# Patient Record
Sex: MALE | Race: WHITE | NOT HISPANIC OR LATINO | Employment: UNEMPLOYED | URBAN - NONMETROPOLITAN AREA
[De-identification: names, ages, dates, MRNs, and addresses within clinical notes are randomized per-mention and may not be internally consistent; named-entity substitution may affect disease eponyms.]

---

## 2021-11-23 ENCOUNTER — HOSPITAL ENCOUNTER (EMERGENCY)
Facility: HOSPITAL | Age: 41
Discharge: HOME | End: 2021-11-23
Attending: EMERGENCY MEDICINE | Admitting: EMERGENCY MEDICINE

## 2021-11-23 VITALS
TEMPERATURE: 99.1 F | OXYGEN SATURATION: 97 % | DIASTOLIC BLOOD PRESSURE: 84 MMHG | HEIGHT: 66 IN | WEIGHT: 205.6 LBS | HEART RATE: 99 BPM | BODY MASS INDEX: 33.04 KG/M2 | SYSTOLIC BLOOD PRESSURE: 126 MMHG | RESPIRATION RATE: 18 BRPM

## 2021-11-23 DIAGNOSIS — R20.2 PARESTHESIA OF BILATERAL LEGS: Primary | ICD-10-CM

## 2021-11-23 PROCEDURE — 99282 EMERGENCY DEPT VISIT SF MDM: CPT

## 2021-11-23 RX ORDER — RISPERIDONE 1 MG/1
1 TABLET ORAL 2 TIMES DAILY
COMMUNITY
End: 2022-06-01

## 2021-11-23 RX ORDER — DIVALPROEX SODIUM 250 MG/1
250 TABLET, DELAYED RELEASE ORAL 3 TIMES DAILY
COMMUNITY
End: 2022-06-01

## 2021-11-23 NOTE — ED PROVIDER NOTES
Subjective   History of Present Illness   Patient is a 41-year-old male with history of bipolar disorder as well as CAD presenting to the ER with complaints of bilateral tingling and paresthesias to his thighs.  Patient states that he has sensation but it feels weird when he touches his thighs.  He has been ambulating without any difficulty.  He states he has had intermittent headaches but denies any additional associated symptoms.  He denies any recent injury or fall, back pain, vision changes or additional neurologic symptoms.  He denies urinary symptoms and any additional symptoms or complaints at this time.  Patient states he went to urgent care and they told him to come to the ER.  Patient does mention that he recently had his psychiatric medications adjusted.  He states his Depakote was decreased and he has been out of his risperidone for 5 days.  He states he has an appointment with his psychiatrist sometime in the next couple of days to get refills.  He is unsure if these medication changes could be causing his symptoms.    Review of Systems   Neurological:        Bilateral lower extremity paresthesias   All other systems reviewed and are negative.      Past Medical History:   Diagnosis Date   • Bipolar 1 disorder (HCC)    • Myocardial infarction (HCC)        No Known Allergies    Past Surgical History:   Procedure Laterality Date   • CARDIAC SURGERY         History reviewed. No pertinent family history.    Social History     Socioeconomic History   • Marital status: Single   Tobacco Use   • Smoking status: Current Every Day Smoker     Packs/day: 1.00     Types: Cigarettes   • Smokeless tobacco: Never Used   Vaping Use   • Vaping Use: Never used   Substance and Sexual Activity   • Alcohol use: Yes     Comment: pt reports very rarely   • Drug use: Yes     Types: Marijuana   • Sexual activity: Defer           Objective   Physical Exam  Vitals and nursing note reviewed.   Constitutional:       General: He is  not in acute distress.     Appearance: He is not toxic-appearing.   HENT:      Head: Normocephalic and atraumatic.      Right Ear: External ear normal.      Left Ear: External ear normal.      Nose: Nose normal.   Eyes:      Extraocular Movements: Extraocular movements intact.      Conjunctiva/sclera: Conjunctivae normal.   Cardiovascular:      Rate and Rhythm: Normal rate.      Pulses: Normal pulses.      Heart sounds: Normal heart sounds.   Pulmonary:      Effort: Pulmonary effort is normal. No respiratory distress.      Breath sounds: Normal breath sounds.   Abdominal:      General: There is no distension.      Palpations: Abdomen is soft.      Tenderness: There is no abdominal tenderness.   Musculoskeletal:         General: No swelling, tenderness, deformity or signs of injury. Normal range of motion.      Cervical back: Normal range of motion and neck supple.      Comments: Patient has 5 out of 5 motor strength in his extremities, sensation is intact, 2+ pulses, capillary refill less than 2 seconds   Skin:     General: Skin is warm and dry.   Neurological:      General: No focal deficit present.      Mental Status: He is alert and oriented to person, place, and time.      Motor: No weakness.      Coordination: Coordination normal.      Gait: Gait normal.   Psychiatric:         Mood and Affect: Mood normal.         Behavior: Behavior normal.         Procedures           ED Course                                           MDM   Patient was evaluated in the ER for paresthesia to bilateral thighs x1 month that has become constant over the last day or 2.  Vitals are within normal limits.  Exam is unremarkable.  Patient is ambulating without difficulty, intact sensation on exam, no neurologic deficits.  Patient has had some recent psychiatric medication changes which could be contributing to symptoms.  He was offered basic labs to check electrolytes.  Patient states that he just wanted to make sure he is okay and if  we feel comfortable discharging him, he is agreeable with this.  I discussed with patient that there does not seem to be any need for emergent work-up today and he should follow-up with his primary care provider if symptoms persist for further outpatient evaluation. He is agreeable with this plan.  Precautions were given for return to the ER for any new or worsening symptoms.    Final diagnoses:   Paresthesia of bilateral legs       ED Disposition  ED Disposition     ED Disposition Condition Comment    Discharge Stable           Primary Care    Schedule an appointment as soon as possible for a visit   for re-evaluation of today's complaint    [facility]  [address] [address] [phone]  Go to   As needed, If symptoms worsen         Medication List      No changes were made to your prescriptions during this visit.          Keren Barajas PA-C  11/23/21 1227

## 2022-04-07 ENCOUNTER — HOSPITAL ENCOUNTER (EMERGENCY)
Facility: HOSPITAL | Age: 42
Discharge: HOME | End: 2022-04-07
Attending: EMERGENCY MEDICINE | Admitting: EMERGENCY MEDICINE

## 2022-04-07 VITALS
TEMPERATURE: 98.4 F | RESPIRATION RATE: 16 BRPM | SYSTOLIC BLOOD PRESSURE: 138 MMHG | HEART RATE: 88 BPM | BODY MASS INDEX: 32.47 KG/M2 | WEIGHT: 202 LBS | HEIGHT: 66 IN | DIASTOLIC BLOOD PRESSURE: 78 MMHG | OXYGEN SATURATION: 99 %

## 2022-04-07 DIAGNOSIS — L03.116 CELLULITIS OF LEFT LOWER EXTREMITY: Primary | ICD-10-CM

## 2022-04-07 PROCEDURE — 99282 EMERGENCY DEPT VISIT SF MDM: CPT

## 2022-04-07 RX ORDER — CEPHALEXIN 500 MG/1
500 CAPSULE ORAL 3 TIMES DAILY
Qty: 21 CAPSULE | Refills: 0 | Status: SHIPPED | OUTPATIENT
Start: 2022-04-07 | End: 2022-04-14

## 2022-04-07 NOTE — ED PROVIDER NOTES
"Subjective   41-year-old male presents with \"bumps on the left inner thigh\" he noticed knots in his leg 2 days ago, he has 1 area that is red and painful to touch.      History provided by:  Patient   used: No        Review of Systems   Skin:        2 kn on left inner thigh one painful and red   All other systems reviewed and are negative.      Past Medical History:   Diagnosis Date   • Bipolar 1 disorder (HCC)    • Myocardial infarction (HCC)        No Known Allergies    Past Surgical History:   Procedure Laterality Date   • CARDIAC SURGERY         No family history on file.    Social History     Socioeconomic History   • Marital status: Single   Tobacco Use   • Smoking status: Current Every Day Smoker     Packs/day: 1.00     Types: Cigarettes   • Smokeless tobacco: Never Used   Vaping Use   • Vaping Use: Never used   Substance and Sexual Activity   • Alcohol use: Yes     Comment: pt reports very rarely   • Drug use: Yes     Types: Marijuana   • Sexual activity: Defer           Objective   Physical Exam  Vitals and nursing note reviewed.   Constitutional:       Appearance: He is well-developed.   HENT:      Head: Normocephalic and atraumatic.   Cardiovascular:      Rate and Rhythm: Normal rate.   Musculoskeletal:      Cervical back: Normal range of motion.        Legs:       Comments: 2 small knots left inner thigh, the most proximal area has surrounding cellulitis and is tender to touch   Skin:     General: Skin is warm and dry.   Neurological:      Mental Status: He is alert and oriented to person, place, and time.   Psychiatric:         Behavior: Behavior normal.         Thought Content: Thought content normal.         Judgment: Judgment normal.         Procedures           ED Course                                                 MDM  Number of Diagnoses or Management Options  Cellulitis of left lower extremity: new and requires workup  Risk of Complications, Morbidity, and/or " Mortality  Presenting problems: minimal  Management options: minimal    Patient Progress  Patient progress: stable      Final diagnoses:   Cellulitis of left lower extremity       ED Disposition  ED Disposition     ED Disposition   Discharge    Condition   Stable    Comment   --             [facility]  [address] [address] [phone]    If symptoms worsen         Medication List      New Prescriptions    cephalexin 500 MG capsule  Commonly known as: KEFLEX  Take 1 capsule by mouth 3 (Three) Times a Day for 7 days.           Where to Get Your Medications      These medications were sent to [facility] [address] [place] [address] - [address] [place] [address] - [phone] [facility] [phone]   [address] [facility] [address]    Phone: [phone]   · cephalexin 500 MG capsule          David Trimble Jr., PA-C  04/07/22 2988

## 2022-04-26 ENCOUNTER — HOSPITAL ENCOUNTER (EMERGENCY)
Facility: HOSPITAL | Age: 42
Discharge: HOME | End: 2022-04-26
Attending: EMERGENCY MEDICINE | Admitting: EMERGENCY MEDICINE

## 2022-04-26 ENCOUNTER — APPOINTMENT (OUTPATIENT)
Dept: GENERAL RADIOLOGY | Facility: HOSPITAL | Age: 42
End: 2022-04-26

## 2022-04-26 VITALS
RESPIRATION RATE: 18 BRPM | SYSTOLIC BLOOD PRESSURE: 127 MMHG | WEIGHT: 198 LBS | HEART RATE: 69 BPM | HEIGHT: 66 IN | TEMPERATURE: 98 F | OXYGEN SATURATION: 98 % | DIASTOLIC BLOOD PRESSURE: 85 MMHG | BODY MASS INDEX: 31.82 KG/M2

## 2022-04-26 DIAGNOSIS — S92.314A NONDISPLACED FRACTURE OF FIRST METATARSAL BONE, RIGHT FOOT, INITIAL ENCOUNTER FOR CLOSED FRACTURE: Primary | ICD-10-CM

## 2022-04-26 PROCEDURE — 73630 X-RAY EXAM OF FOOT: CPT

## 2022-04-26 PROCEDURE — 99283 EMERGENCY DEPT VISIT LOW MDM: CPT

## 2022-04-26 RX ORDER — HYDROCODONE BITARTRATE AND ACETAMINOPHEN 5; 325 MG/1; MG/1
1 TABLET ORAL EVERY 6 HOURS PRN
Qty: 12 TABLET | Refills: 0 | Status: SHIPPED | OUTPATIENT
Start: 2022-04-26 | End: 2022-05-10

## 2022-04-26 NOTE — ED PROVIDER NOTES
Subjective   Chief Complaint: Right great toe injury  History of Present Illness: 41-year-old male comes in for evaluation above complaint.  He came in table downstairs and dropped it in the foot of the table crushed his right great toe.  He complains of pain over the right first MTP.  Nail is normal with no signs of subungual hemorrhage.  Able to bear weight but is painful with any flexion or extension of the right great toe at the MTP joint.  There is some ecchymosis noted.  No obvious deformity.  Onset: Last night  Timing: Single inciting injury with ongoing pain  Exacerbating / Alleviating factors: Worse with any range of motion of the right great toe weightbearing  Associated symptoms: None      Nurses Notes reviewed and agree, including vitals, allergies, social history and prior medical history.          Review of Systems   Constitutional: Negative.    HENT: Negative.    Eyes: Negative.    Respiratory: Negative.    Cardiovascular: Negative.    Gastrointestinal: Negative.    Genitourinary: Negative.    Musculoskeletal:        Right great toe pain   Skin: Negative.    Allergic/Immunologic: Negative.    Neurological: Negative.    Psychiatric/Behavioral: Negative.    All other systems reviewed and are negative.      Past Medical History:   Diagnosis Date   • Bipolar 1 disorder (HCC)    • Myocardial infarction (HCC)        No Known Allergies    Past Surgical History:   Procedure Laterality Date   • CARDIAC SURGERY         No family history on file.    Social History     Socioeconomic History   • Marital status: Single   Tobacco Use   • Smoking status: Current Every Day Smoker     Packs/day: 1.00     Types: Cigarettes   • Smokeless tobacco: Never Used   Vaping Use   • Vaping Use: Never used   Substance and Sexual Activity   • Alcohol use: Yes     Comment: pt reports very rarely   • Drug use: Yes     Types: Marijuana   • Sexual activity: Defer           Objective   Physical Exam  Vitals and nursing note reviewed.    Constitutional:       General: He is not in acute distress.     Appearance: Normal appearance. He is not ill-appearing, toxic-appearing or diaphoretic.   HENT:      Head: Normocephalic and atraumatic.      Nose: Nose normal.   Eyes:      Extraocular Movements: Extraocular movements intact.   Cardiovascular:      Rate and Rhythm: Normal rate and regular rhythm.      Pulses: Normal pulses.   Pulmonary:      Effort: Pulmonary effort is normal.   Abdominal:      General: Abdomen is flat.   Musculoskeletal:      Cervical back: Normal range of motion.      Comments: Ecchymosis to the dorsal medial first MTP joint of the right foot.  2+ radial pulse.  No obvious deformity.  Nails intact without injury, no subungual hematoma.   Skin:     General: Skin is warm and dry.   Neurological:      General: No focal deficit present.      Mental Status: He is alert. Mental status is at baseline.   Psychiatric:         Mood and Affect: Mood normal.         Behavior: Behavior normal.         Procedures           ED Course                                                 MDM    Final diagnoses:   Nondisplaced fracture of first metatarsal bone, right foot, initial encounter for closed fracture       ED Disposition  ED Disposition     ED Disposition   Discharge    Condition   Stable    Comment   --             No follow-up provider specified.       Medication List      No changes were made to your prescriptions during this visit.          Geo Henriquez PA-C  04/26/22 9168

## 2022-05-10 ENCOUNTER — OFFICE VISIT (OUTPATIENT)
Dept: INTERNAL MEDICINE | Facility: CLINIC | Age: 42
End: 2022-05-10

## 2022-05-10 VITALS
SYSTOLIC BLOOD PRESSURE: 150 MMHG | DIASTOLIC BLOOD PRESSURE: 98 MMHG | OXYGEN SATURATION: 99 % | WEIGHT: 196 LBS | TEMPERATURE: 97.8 F | HEART RATE: 90 BPM | HEIGHT: 66 IN | BODY MASS INDEX: 31.5 KG/M2

## 2022-05-10 DIAGNOSIS — Z13.0 SCREENING FOR DISORDER OF BLOOD AND BLOOD-FORMING ORGANS: ICD-10-CM

## 2022-05-10 DIAGNOSIS — Z76.89 ENCOUNTER TO ESTABLISH CARE: Primary | ICD-10-CM

## 2022-05-10 DIAGNOSIS — Z13.220 SCREENING FOR LIPID DISORDERS: ICD-10-CM

## 2022-05-10 DIAGNOSIS — Z13.228 SCREENING FOR ENDOCRINE, METABOLIC AND IMMUNITY DISORDER: ICD-10-CM

## 2022-05-10 DIAGNOSIS — Z00.00 ANNUAL PHYSICAL EXAM: ICD-10-CM

## 2022-05-10 DIAGNOSIS — I25.2 HISTORY OF MYOCARDIAL INFARCTION: ICD-10-CM

## 2022-05-10 DIAGNOSIS — F31.9 BIPOLAR AFFECTIVE DISORDER, REMISSION STATUS UNSPECIFIED: ICD-10-CM

## 2022-05-10 DIAGNOSIS — E66.09 CLASS 1 OBESITY DUE TO EXCESS CALORIES WITH SERIOUS COMORBIDITY AND BODY MASS INDEX (BMI) OF 31.0 TO 31.9 IN ADULT: ICD-10-CM

## 2022-05-10 DIAGNOSIS — F11.91 HISTORY OF METHADONE USE: ICD-10-CM

## 2022-05-10 DIAGNOSIS — Z13.29 SCREENING FOR ENDOCRINE, METABOLIC AND IMMUNITY DISORDER: ICD-10-CM

## 2022-05-10 DIAGNOSIS — R07.9 CHEST PAIN, UNSPECIFIED TYPE: ICD-10-CM

## 2022-05-10 DIAGNOSIS — I10 PRIMARY HYPERTENSION: ICD-10-CM

## 2022-05-10 DIAGNOSIS — Z13.0 SCREENING FOR ENDOCRINE, METABOLIC AND IMMUNITY DISORDER: ICD-10-CM

## 2022-05-10 PROCEDURE — 93000 ELECTROCARDIOGRAM COMPLETE: CPT | Performed by: NURSE PRACTITIONER

## 2022-05-10 PROCEDURE — 99385 PREV VISIT NEW AGE 18-39: CPT | Performed by: NURSE PRACTITIONER

## 2022-05-10 PROCEDURE — 2014F MENTAL STATUS ASSESS: CPT | Performed by: NURSE PRACTITIONER

## 2022-05-10 PROCEDURE — 3008F BODY MASS INDEX DOCD: CPT | Performed by: NURSE PRACTITIONER

## 2022-05-10 RX ORDER — ASPIRIN 81 MG/1
81 TABLET ORAL DAILY
Qty: 90 TABLET | Refills: 3 | Status: SHIPPED | OUTPATIENT
Start: 2022-05-10

## 2022-05-10 RX ORDER — LISINOPRIL 20 MG/1
20 TABLET ORAL DAILY
Qty: 30 TABLET | Refills: 1 | Status: SHIPPED | OUTPATIENT
Start: 2022-05-10

## 2022-05-10 NOTE — PROGRESS NOTES
Chief Complaint   Patient presents with   • Establish Care     Thyroid, elevated bp, hx of high cholesterol       Sekou Hackett is a 41 y.o. male and is here to establish care and obtain a comprehensive physical exam. Moved to [place] from [place] last summer.  Has not had insurance until a week ago.      Seen at [facility] ED on 4/26/22 after dropping a table on his right foot.  A nondisplaced fracture of first metatarsal bone of his right foot seen on XRay.  Referred to [facility]. Blood pressure noted to be elevated at orthopedist's office, when evaluated for right great fracture.  Supposed to be wearing a boot, irritating the sides of great toe and pinky toe.  He thinks he was developing a blister, stopped wearing boot.  Continues to have slight pain.  Not currently taking NSAIDS, acetaminophen for pain; not needed.        Has noted vision changes.  History of blood clot in his heart, removed surgically after clots caused an MI. No bleeding or clotting disorder identified per patient.  Supposed to be taking an aspirin every day.  Occasional chest pain with SOA, feels it is stress related.  Describes pain as dull ache on the left side of his chest. Associated with diaphoresis, not with exertion, anxiety, nausea, left arm/jaw/back pain, syncope.  Does not monitor BP at home, has taken lisinopril in the past.  Denies dyspnea, orthopnea, palpitations, lower extremity edema, confusion, headaches, weakness, visual disturbances.      Bipolar disorder: 5 day admission to [facility].  Had been strung out on meth prior to moving to [place]. When he moved here, his sister called the police and he was taken to [facility].  He doesn't recall situation surrounding  being called.  Took divalproex, risperidone as prescribed until medication ran out.  Feels it worked well.  No current mood swings, depression, anxiety, hallucinations, panic attacks, SI, HI.  Has been to behavioral health counseling,  felt it was helpful.     History of meth use; has not used it since prior to admission to [facility].  Smokes marijuana occasionally.  Denies alcohol use.     History:  Erectile dysfunction  no  Nocturia  2-3 x a week   Treated for gonorrhea, chlamydia in the past    Do you take any herbs or supplements that were not prescribed by a doctor? no  Are you taking aspirin daily? no    Health Habits:  Dental Exam. not up to date - provided list of local dental providers to make an appointment  Eye Exam. not up to date.  Wears glasses  Exercise: 0 times/week.  Current exercise activities include: walking   Diet: healthy diet    Health Maintenance   Topic Date Due   • Pneumococcal Vaccine 0-64 (1 - PCV) Never done   • TDAP/TD VACCINES (1 - Tdap) Never done   • COVID-19 Vaccine (2 - Booster for Tracey series) 10/14/2021   • HEPATITIS C SCREENING  Never done   • INFLUENZA VACCINE  08/01/2022   • ANNUAL PHYSICAL  05/11/2023       PMH, PSH, SocHx, FamHx, Allergies, and Medications: Reviewed and updated in the Visit Navigator.     No Known Allergies  Past Medical History:   Diagnosis Date   • Bipolar 1 disorder (HCC)    • Bipolar disorder (HCC)    • Heart attack (HCC)    • History of blood clots    • Myocardial infarction (HCC)      Past Surgical History:   Procedure Laterality Date   • CARDIAC SURGERY       Social History     Socioeconomic History   • Marital status: Single   Tobacco Use   • Smoking status: Current Every Day Smoker     Packs/day: 0.50     Years: 20.00     Pack years: 10.00     Types: Cigarettes   • Smokeless tobacco: Never Used   Vaping Use   • Vaping Use: Never used   Substance and Sexual Activity   • Alcohol use: Yes     Comment: pt reports very rarely, couple times a year   • Drug use: Yes     Types: Marijuana   • Sexual activity: Defer     Family History   Problem Relation Age of Onset   • Mental illness Mother    • Hypertension Mother    • Heart attack Mother        Review of Systems  Review  "of Systems   All other systems reviewed and are negative.      Objective   /98   Pulse 90   Temp 97.8 °F (36.6 °C)   Ht 167.6 cm (65.98\")   Wt 88.9 kg (196 lb)   SpO2 99%   BMI 31.65 kg/m²   Body mass index is 31.65 kg/m².    Physical Exam  Constitutional:       Appearance: He is well-developed. He is not ill-appearing.   HENT:      Head: Normocephalic.      Right Ear: Tympanic membrane, ear canal and external ear normal.      Left Ear: Tympanic membrane, ear canal and external ear normal.      Nose: Nose normal.      Mouth/Throat:      Mouth: Mucous membranes are moist.      Pharynx: Oropharynx is clear. Uvula midline.   Eyes:      Extraocular Movements: Extraocular movements intact.      Conjunctiva/sclera: Conjunctivae normal.      Pupils: Pupils are equal, round, and reactive to light.   Neck:      Thyroid: No thyromegaly.      Vascular: No carotid bruit.   Cardiovascular:      Rate and Rhythm: Normal rate and regular rhythm.      Pulses:           Radial pulses are 2+ on the right side and 2+ on the left side.        Dorsalis pedis pulses are 2+ on the right side and 2+ on the left side.      Heart sounds: Normal heart sounds.   Pulmonary:      Effort: Pulmonary effort is normal.      Breath sounds: Normal breath sounds.   Abdominal:      General: Bowel sounds are normal.      Palpations: Abdomen is soft.      Tenderness: There is no abdominal tenderness.   Musculoskeletal:         General: No tenderness or deformity. Normal range of motion.      Cervical back: Full passive range of motion without pain, normal range of motion and neck supple.      Right lower leg: No edema.      Left lower leg: No edema.   Lymphadenopathy:      Cervical: No cervical adenopathy.   Skin:     General: Skin is warm.      Capillary Refill: Capillary refill takes less than 2 seconds.   Neurological:      Mental Status: He is alert and oriented to person, place, and time.      Sensory: No sensory deficit.      " Coordination: Coordination normal.      Gait: Gait normal.      Comments: CN II-XII normal   Psychiatric:         Attention and Perception: Attention normal.         Mood and Affect: Mood and affect normal.         Speech: Speech normal.         Behavior: Behavior normal.         Thought Content: Thought content normal.           ECG 12 Lead    Date/Time: 5/10/2022 2:50 PM  Performed by: Berta Michaud APRN  Authorized by: Berta Michaud APRN   Comparison: not compared with previous ECG   Previous ECG: no previous ECG available  Rhythm: sinus rhythm  Rate: normal  BPM: 74  T wave elevation noted on lead: mild elevation.    Clinical impression: normal ECG  Clinical impression comment: mild elevation Twaves in V2, V3, V4            Assessment and Plan  1. Healthy male exam.    2. Patient Counseling: Including but not Limited to the following, when appropriate:  --Nutrition: Stressed importance of moderation in sodium/caffeine intake, saturated fat and cholesterol, caloric balance, sufficient intake of fresh fruits, vegetables, fiber  .--Discussed the issue of daily use of baby aspirin.  --Exercise: Stressed the importance of regular exercise.   --Substance Abuse: Discussed cessation/primary prevention of tobacco, alcohol, or other drug use; driving or other dangerous activities under the influence; availability of treatment for abuse, as indicated based on social history.    --Sexuality: Discussed sexually transmitted diseases, partner selection, use of condoms and contraceptive alternatives.   --Injury prevention: Discussed safety belts, safety helmets, smoke detector, smoking near bedding or upholstery.   --Dental health: Discussed importance of regular tooth brushing, flossing, and dental visits.  --Immunizations reviewed.  3. Discussed the patient's BMI with him.  The BMI is above average; BMI management plan is completed  4. Return in about 6 months (around 11/10/2022) for Next scheduled follow  up.  5. Age-appropriate Screening Scheduled    Diagnoses and all orders for this visit:    1. Encounter to establish care (Primary)    2. Annual physical exam    3. Bipolar affective disorder, remission status unspecified (HCC)  -     Ambulatory Referral to Psychiatry  - Declined referral to Behavioral Health at this time    4. History of myocardial infarction  -     ECG 12 Lead  -     Ambulatory Referral to Cardiology  -     aspirin (aspirin) 81 MG EC tablet; Take 1 tablet by mouth Daily.  Dispense: 90 tablet; Refill: 3  - Heart healthy diet, daily physical activity as tolerated    5. Chest pain, unspecified type  -     ECG 12 Lead  -     Ambulatory Referral to Cardiology  -     aspirin (aspirin) 81 MG EC tablet; Take 1 tablet by mouth Daily.  Dispense: 90 tablet; Refill: 3  - Advised to go to the emergency room/call 911 should chest pain develop prior to appointment with cardiology    6. Primary hypertension  -     ECG 12 Lead  -     Comprehensive Metabolic Panel  -     lisinopril (PRINIVIL,ZESTRIL) 20 MG tablet; Take 1 tablet by mouth Daily.  Dispense: 30 tablet; Refill: 1  -     aspirin (aspirin) 81 MG EC tablet; Take 1 tablet by mouth Daily.  Dispense: 90 tablet; Refill: 3        - Follow heart healthy diet.  Keep sodium intake < 1500 mg per day.  Avoid processed & fast foods.          - Exercise as tolerated, with a goal of 30 minutes of moderate exercise most days.         - Take medications as prescribed.    7. Screening for disorder of blood and blood-forming organs  -     CBC & Differential    8. Screening for lipid disorders  -     Lipid Panel    9. Screening for endocrine, metabolic and immunity disorder  -     TSH  -     Thyroid Peroxidase Antibody  -     T4, Free    10. Class 1 obesity due to excess calories with serious comorbidity and body mass index (BMI) of 31.0 to 31.9 in adult        - BMI is >= 30 and <= 34.9 (Class 1 obesity). The following options were offered after discussion: exercise  counseling/recommendations and nutrition counseling/recommendations    11.  History of methadone use             - Continue to abstain from meth, illicit drug use.        - Advised can refer to IOP if needed.

## 2022-05-12 LAB
ALBUMIN SERPL-MCNC: 4.5 G/DL (ref 3.5–5.2)
ALBUMIN/GLOB SERPL: 1.7 G/DL
ALP SERPL-CCNC: 91 U/L (ref 39–117)
ALT SERPL-CCNC: 28 U/L (ref 1–41)
AST SERPL-CCNC: 19 U/L (ref 1–40)
BASOPHILS # BLD AUTO: 0.06 10*3/MM3 (ref 0–0.2)
BASOPHILS NFR BLD AUTO: 0.8 % (ref 0–1.5)
BILIRUB SERPL-MCNC: 0.5 MG/DL (ref 0–1.2)
BUN SERPL-MCNC: 13 MG/DL (ref 6–20)
BUN/CREAT SERPL: 10 (ref 7–25)
CALCIUM SERPL-MCNC: 10.2 MG/DL (ref 8.6–10.5)
CHLORIDE SERPL-SCNC: 98 MMOL/L (ref 98–107)
CHOLEST SERPL-MCNC: 218 MG/DL (ref 0–200)
CO2 SERPL-SCNC: 24.1 MMOL/L (ref 22–29)
CREAT SERPL-MCNC: 1.3 MG/DL (ref 0.76–1.27)
EGFRCR SERPLBLD CKD-EPI 2021: 71.7 ML/MIN/1.73
EOSINOPHIL # BLD AUTO: 0.29 10*3/MM3 (ref 0–0.4)
EOSINOPHIL NFR BLD AUTO: 3.8 % (ref 0.3–6.2)
ERYTHROCYTE [DISTWIDTH] IN BLOOD BY AUTOMATED COUNT: 13 % (ref 12.3–15.4)
GLOBULIN SER CALC-MCNC: 2.7 GM/DL
GLUCOSE SERPL-MCNC: 109 MG/DL (ref 65–99)
HCT VFR BLD AUTO: 51.2 % (ref 37.5–51)
HDLC SERPL-MCNC: 36 MG/DL (ref 40–60)
HGB BLD-MCNC: 16.9 G/DL (ref 13–17.7)
IMM GRANULOCYTES # BLD AUTO: 0.01 10*3/MM3 (ref 0–0.05)
IMM GRANULOCYTES NFR BLD AUTO: 0.1 % (ref 0–0.5)
LDLC SERPL CALC-MCNC: 150 MG/DL (ref 0–100)
LYMPHOCYTES # BLD AUTO: 2.37 10*3/MM3 (ref 0.7–3.1)
LYMPHOCYTES NFR BLD AUTO: 30.7 % (ref 19.6–45.3)
MCH RBC QN AUTO: 29.4 PG (ref 26.6–33)
MCHC RBC AUTO-ENTMCNC: 33 G/DL (ref 31.5–35.7)
MCV RBC AUTO: 89 FL (ref 79–97)
MONOCYTES # BLD AUTO: 0.63 10*3/MM3 (ref 0.1–0.9)
MONOCYTES NFR BLD AUTO: 8.2 % (ref 5–12)
NEUTROPHILS # BLD AUTO: 4.37 10*3/MM3 (ref 1.7–7)
NEUTROPHILS NFR BLD AUTO: 56.4 % (ref 42.7–76)
NRBC BLD AUTO-RTO: 0 /100 WBC (ref 0–0.2)
PLATELET # BLD AUTO: 324 10*3/MM3 (ref 140–450)
POTASSIUM SERPL-SCNC: 4.6 MMOL/L (ref 3.5–5.2)
PROT SERPL-MCNC: 7.2 G/DL (ref 6–8.5)
RBC # BLD AUTO: 5.75 10*6/MM3 (ref 4.14–5.8)
SODIUM SERPL-SCNC: 136 MMOL/L (ref 136–145)
T4 FREE SERPL-MCNC: 1.43 NG/DL (ref 0.93–1.7)
THYROPEROXIDASE AB SERPL-ACNC: <8 IU/ML (ref 0–34)
TRIGL SERPL-MCNC: 176 MG/DL (ref 0–150)
TSH SERPL DL<=0.005 MIU/L-ACNC: 1.48 UIU/ML (ref 0.27–4.2)
VLDLC SERPL CALC-MCNC: 32 MG/DL (ref 5–40)
WBC # BLD AUTO: 7.73 10*3/MM3 (ref 3.4–10.8)

## 2022-05-13 NOTE — PROGRESS NOTES
Creatinine is very slightly elevated, may be due to dehydration.  Total cholesterol, triglycerides, LDL are elevated.  Eat a heart healthy diet, the Mediterranean plan is a good plan with lots of resources and recipes available online.  HDL, good cholesterol, is slightly low.  Make sure to include omega-3 fatty acids in diet, found in salmon, tuna, walnuts, almonds, [place] sprouts, lima beans.  Be physically active most days of the week, outside of work, with a goal of 30 to 45 minutes of moderate exercise most days of the week.  Other labs are normal.

## 2022-06-01 ENCOUNTER — OFFICE VISIT (OUTPATIENT)
Dept: CARDIOLOGY | Facility: CLINIC | Age: 42
End: 2022-06-01

## 2022-06-01 VITALS
BODY MASS INDEX: 33.32 KG/M2 | DIASTOLIC BLOOD PRESSURE: 58 MMHG | WEIGHT: 200 LBS | SYSTOLIC BLOOD PRESSURE: 98 MMHG | HEIGHT: 65 IN | HEART RATE: 83 BPM | OXYGEN SATURATION: 95 %

## 2022-06-01 DIAGNOSIS — E66.2 CLASS 1 OBESITY WITH ALVEOLAR HYPOVENTILATION, SERIOUS COMORBIDITY, AND BODY MASS INDEX (BMI) OF 33.0 TO 33.9 IN ADULT: ICD-10-CM

## 2022-06-01 DIAGNOSIS — I10 PRIMARY HYPERTENSION: ICD-10-CM

## 2022-06-01 DIAGNOSIS — Z72.0 TOBACCO ABUSE: ICD-10-CM

## 2022-06-01 DIAGNOSIS — R07.2 PRECORDIAL PAIN: Primary | ICD-10-CM

## 2022-06-01 PROCEDURE — 99203 OFFICE O/P NEW LOW 30 MIN: CPT | Performed by: INTERNAL MEDICINE

## 2022-06-01 NOTE — PROGRESS NOTES
Subjective:     Encounter Date:06/01/2022      Patient ID: Sekou Hackett is a 41 y.o. male.    Chief Complaint: Chest pain  HPI  This is a 41-year-old male patient who presents to cardiology clinic to evaluate chest discomfort.  The patient reports a 2-month history of chest pain.  This occurs over the precordium and does not radiate.  It is described as a dull aching pain that occurs approximately 2-3 times per month.  The patient feels like he has had approximately 4-5 episodes since onset.  The discomfort typically has a 2-3/10 intensity.  The discomfort generally last anywhere from 1 to 2 seconds to less than 5 minutes.  It is associated with dizziness but no nausea vomiting diaphoresis or shortness of breath.  The discomfort occurs both at rest and with activities.  He cannot identify any consistent precipitating aggravating or alleviating features.  There is no pleuritic component.  The patient indicates that approximately 8 to 9 years ago he developed an episode of severe left arm and shoulder discomfort.  The patient was apparently experiencing a myocardial infarction and describes what sounds like a radial approach coronary angiogram with coronary thrombosis and subsequent thrombectomy.  He does not recall being told that he had angioplasty or stent placement.  Records are unavailable.  His twelve-lead electrocardiogram is normal showing no evidence of prior infarction.  He has a history of hypertension but no personal history of diabetes or dyslipidemia.  He is overweight.  He is currently smoking 1/2 pack cigarettes per day.  The following portions of the patient's history were reviewed and updated as appropriate: allergies, current medications, past family history, past medical history, past social history, past surgical history and problem  Review of Systems   Constitutional: Negative for chills, diaphoresis, fever, malaise/fatigue, weight gain and weight loss.   HENT: Negative for ear discharge,  hearing loss, hoarse voice and nosebleeds.    Eyes: Negative for discharge, double vision, pain and photophobia.   Cardiovascular: Positive for chest pain. Negative for claudication, cyanosis, dyspnea on exertion, irregular heartbeat, leg swelling, near-syncope, orthopnea, palpitations, paroxysmal nocturnal dyspnea and syncope.   Respiratory: Negative for cough, hemoptysis, shortness of breath, sputum production and wheezing.    Endocrine: Negative for cold intolerance, heat intolerance, polydipsia, polyphagia and polyuria.   Hematologic/Lymphatic: Negative for adenopathy and bleeding problem. Does not bruise/bleed easily.   Skin: Negative for color change, flushing, itching and rash.   Musculoskeletal: Negative for muscle cramps, muscle weakness, myalgias and stiffness.   Gastrointestinal: Negative for abdominal pain, diarrhea, hematemesis, hematochezia, nausea and vomiting.   Genitourinary: Negative for dysuria, frequency and nocturia.   Neurological: Negative for focal weakness, loss of balance, numbness, paresthesias and seizures.   Psychiatric/Behavioral: Negative for altered mental status, hallucinations and suicidal ideas.   Allergic/Immunologic: Negative for HIV exposure, hives and persistent infections.           Current Outpatient Medications:   •  aspirin (aspirin) 81 MG EC tablet, Take 1 tablet by mouth Daily., Disp: 90 tablet, Rfl: 3  •  lisinopril (PRINIVIL,ZESTRIL) 20 MG tablet, Take 1 tablet by mouth Daily., Disp: 30 tablet, Rfl: 1    Objective:   Vitals and nursing note reviewed.   Constitutional:       Appearance: Healthy appearance. Not in distress.   Neck:      Vascular: No JVR. JVD normal.   Pulmonary:      Effort: Pulmonary effort is normal.      Breath sounds: Normal breath sounds. No wheezing. No rhonchi. No rales.   Chest:      Chest wall: Not tender to palpatation.   Cardiovascular:      PMI at left midclavicular line. Normal rate. Regular rhythm. Normal S1. Normal S2.      Murmurs: There  "is no murmur.      No gallop. No click. No rub.   Pulses:     Intact distal pulses.   Edema:     Peripheral edema absent.   Abdominal:      General: Bowel sounds are normal.      Palpations: Abdomen is soft.      Tenderness: There is no abdominal tenderness.   Musculoskeletal: Normal range of motion.         General: No tenderness. Skin:     General: Skin is warm and dry.   Neurological:      General: No focal deficit present.      Mental Status: Alert and oriented to person, place and time.       Blood pressure 98/58, pulse 83, height 165.1 cm (65\"), weight 90.7 kg (200 lb), SpO2 95 %.   Lab Review:     Assessment:       1. Precordial pain  Atypical chest pain.    2. Primary hypertension  Acceptable blood pressure control.    3. Class 1 obesity with alveolar hypoventilation, serious comorbidity, and body mass index (BMI) of 33.0 to 33.9 in adult (HCC)  Excess calorie intake.    4. Tobacco abuse  Daily cigarette abuse    Procedures    Plan:     I have recommended treadmill exercise stress test.  The patient has been counseled regarding the essential need to discontinue cigarette smoking.  Changes in medications have been made at today's visit.  We have requested records from the [facility] in [place] from prior treatment and these will be reviewed when available.  Further recommendations will be predicated on the results of his outpatient testing.      "

## 2022-06-30 ENCOUNTER — APPOINTMENT (OUTPATIENT)
Dept: GENERAL RADIOLOGY | Facility: HOSPITAL | Age: 42
End: 2022-06-30

## 2022-06-30 ENCOUNTER — HOSPITAL ENCOUNTER (INPATIENT)
Facility: HOSPITAL | Age: 42
LOS: 1 days | Discharge: HOME | End: 2022-07-01
Attending: EMERGENCY MEDICINE | Admitting: FAMILY MEDICINE

## 2022-06-30 DIAGNOSIS — E86.0 DEHYDRATION: ICD-10-CM

## 2022-06-30 DIAGNOSIS — N17.9 AKI (ACUTE KIDNEY INJURY): Primary | ICD-10-CM

## 2022-06-30 DIAGNOSIS — I10 PRIMARY HYPERTENSION: ICD-10-CM

## 2022-06-30 DIAGNOSIS — T67.9XXA HEAT EXPOSURE, INITIAL ENCOUNTER: ICD-10-CM

## 2022-06-30 PROBLEM — M62.82 NON-TRAUMATIC RHABDOMYOLYSIS: Status: ACTIVE | Noted: 2022-06-30

## 2022-06-30 LAB
ALBUMIN SERPL-MCNC: 5 G/DL (ref 3.5–5.2)
ALBUMIN/GLOB SERPL: 1.9 G/DL
ALP SERPL-CCNC: 82 U/L (ref 39–117)
ALT SERPL W P-5'-P-CCNC: 29 U/L (ref 1–41)
AMPHET+METHAMPHET UR QL: NEGATIVE
AMPHETAMINES UR QL: NEGATIVE
ANION GAP SERPL CALCULATED.3IONS-SCNC: 19.9 MMOL/L (ref 5–15)
AST SERPL-CCNC: 52 U/L (ref 1–40)
BACTERIA UR QL AUTO: ABNORMAL /HPF
BARBITURATES UR QL SCN: NEGATIVE
BASOPHILS # BLD AUTO: 0.08 10*3/MM3 (ref 0–0.2)
BASOPHILS NFR BLD AUTO: 0.5 % (ref 0–1.5)
BENZODIAZ UR QL SCN: NEGATIVE
BILIRUB SERPL-MCNC: 0.6 MG/DL (ref 0–1.2)
BILIRUB UR QL STRIP: NEGATIVE
BUN SERPL-MCNC: 34 MG/DL (ref 6–20)
BUN/CREAT SERPL: 12.2 (ref 7–25)
BUPRENORPHINE SERPL-MCNC: NEGATIVE NG/ML
CALCIUM SPEC-SCNC: 9.8 MG/DL (ref 8.6–10.5)
CANNABINOIDS SERPL QL: POSITIVE
CHLORIDE SERPL-SCNC: 97 MMOL/L (ref 98–107)
CK SERPL-CCNC: 1676 U/L (ref 20–200)
CLARITY UR: CLEAR
CO2 SERPL-SCNC: 16.1 MMOL/L (ref 22–29)
COCAINE UR QL: NEGATIVE
COLOR UR: YELLOW
CREAT SERPL-MCNC: 2.78 MG/DL (ref 0.76–1.27)
DEPRECATED RDW RBC AUTO: 38.5 FL (ref 37–54)
EGFRCR SERPLBLD CKD-EPI 2021: 28.6 ML/MIN/1.73
EOSINOPHIL # BLD AUTO: 0.08 10*3/MM3 (ref 0–0.4)
EOSINOPHIL NFR BLD AUTO: 0.5 % (ref 0.3–6.2)
ERYTHROCYTE [DISTWIDTH] IN BLOOD BY AUTOMATED COUNT: 12.9 % (ref 12.3–15.4)
GLOBULIN UR ELPH-MCNC: 2.7 GM/DL
GLUCOSE SERPL-MCNC: 101 MG/DL (ref 65–99)
GLUCOSE UR STRIP-MCNC: NEGATIVE MG/DL
HCT VFR BLD AUTO: 42 % (ref 37.5–51)
HGB BLD-MCNC: 15.2 G/DL (ref 13–17.7)
HGB UR QL STRIP.AUTO: ABNORMAL
HYALINE CASTS UR QL AUTO: ABNORMAL /LPF
IMM GRANULOCYTES # BLD AUTO: 0.08 10*3/MM3 (ref 0–0.05)
IMM GRANULOCYTES NFR BLD AUTO: 0.5 % (ref 0–0.5)
KETONES UR QL STRIP: ABNORMAL
LEUKOCYTE ESTERASE UR QL STRIP.AUTO: NEGATIVE
LIPASE SERPL-CCNC: 19 U/L (ref 13–60)
LYMPHOCYTES # BLD AUTO: 2.01 10*3/MM3 (ref 0.7–3.1)
LYMPHOCYTES NFR BLD AUTO: 12.8 % (ref 19.6–45.3)
MAGNESIUM SERPL-MCNC: 2.4 MG/DL (ref 1.6–2.6)
MCH RBC QN AUTO: 29.9 PG (ref 26.6–33)
MCHC RBC AUTO-ENTMCNC: 36.2 G/DL (ref 31.5–35.7)
MCV RBC AUTO: 82.7 FL (ref 79–97)
METHADONE UR QL SCN: NEGATIVE
MONOCYTES # BLD AUTO: 1.11 10*3/MM3 (ref 0.1–0.9)
MONOCYTES NFR BLD AUTO: 7 % (ref 5–12)
NEUTROPHILS NFR BLD AUTO: 12.4 10*3/MM3 (ref 1.7–7)
NEUTROPHILS NFR BLD AUTO: 78.7 % (ref 42.7–76)
NITRITE UR QL STRIP: NEGATIVE
NRBC BLD AUTO-RTO: 0 /100 WBC (ref 0–0.2)
NT-PROBNP SERPL-MCNC: 71 PG/ML (ref 0–450)
OPIATES UR QL: NEGATIVE
OXYCODONE UR QL SCN: NEGATIVE
PCP UR QL SCN: NEGATIVE
PH UR STRIP.AUTO: 5.5 [PH] (ref 5–8)
PLATELET # BLD AUTO: 375 10*3/MM3 (ref 140–450)
PMV BLD AUTO: 9.8 FL (ref 6–12)
POTASSIUM SERPL-SCNC: 4.1 MMOL/L (ref 3.5–5.2)
PROPOXYPH UR QL: NEGATIVE
PROT SERPL-MCNC: 7.7 G/DL (ref 6–8.5)
PROT UR QL STRIP: ABNORMAL
RBC # BLD AUTO: 5.08 10*6/MM3 (ref 4.14–5.8)
RBC # UR STRIP: ABNORMAL /HPF
REF LAB TEST METHOD: ABNORMAL
SARS-COV-2 RNA PNL SPEC NAA+PROBE: NOT DETECTED
SODIUM SERPL-SCNC: 133 MMOL/L (ref 136–145)
SP GR UR STRIP: 1.01 (ref 1–1.03)
SQUAMOUS #/AREA URNS HPF: ABNORMAL /HPF
TRICYCLICS UR QL SCN: NEGATIVE
TROPONIN T SERPL-MCNC: <0.01 NG/ML (ref 0–0.03)
TROPONIN T SERPL-MCNC: <0.01 NG/ML (ref 0–0.03)
UROBILINOGEN UR QL STRIP: ABNORMAL
WBC # UR STRIP: ABNORMAL /HPF
WBC NRBC COR # BLD: 15.76 10*3/MM3 (ref 3.4–10.8)

## 2022-06-30 PROCEDURE — 83690 ASSAY OF LIPASE: CPT | Performed by: PHYSICIAN ASSISTANT

## 2022-06-30 PROCEDURE — 36415 COLL VENOUS BLD VENIPUNCTURE: CPT

## 2022-06-30 PROCEDURE — 82550 ASSAY OF CK (CPK): CPT | Performed by: PHYSICIAN ASSISTANT

## 2022-06-30 PROCEDURE — 25010000002 HEPARIN (PORCINE) PER 1000 UNITS: Performed by: FAMILY MEDICINE

## 2022-06-30 PROCEDURE — 83880 ASSAY OF NATRIURETIC PEPTIDE: CPT | Performed by: PHYSICIAN ASSISTANT

## 2022-06-30 PROCEDURE — 99223 1ST HOSP IP/OBS HIGH 75: CPT | Performed by: FAMILY MEDICINE

## 2022-06-30 PROCEDURE — 87635 SARS-COV-2 COVID-19 AMP PRB: CPT | Performed by: PHYSICIAN ASSISTANT

## 2022-06-30 PROCEDURE — 80053 COMPREHEN METABOLIC PANEL: CPT | Performed by: PHYSICIAN ASSISTANT

## 2022-06-30 PROCEDURE — 85025 COMPLETE CBC W/AUTO DIFF WBC: CPT | Performed by: PHYSICIAN ASSISTANT

## 2022-06-30 PROCEDURE — 81001 URINALYSIS AUTO W/SCOPE: CPT | Performed by: FAMILY MEDICINE

## 2022-06-30 PROCEDURE — 25010000002 PROCHLORPERAZINE 10 MG/2ML SOLUTION: Performed by: PHYSICIAN ASSISTANT

## 2022-06-30 PROCEDURE — 25010000002 ONDANSETRON PER 1 MG: Performed by: PHYSICIAN ASSISTANT

## 2022-06-30 PROCEDURE — 84484 ASSAY OF TROPONIN QUANT: CPT | Performed by: PHYSICIAN ASSISTANT

## 2022-06-30 PROCEDURE — 80306 DRUG TEST PRSMV INSTRMNT: CPT | Performed by: FAMILY MEDICINE

## 2022-06-30 PROCEDURE — 83735 ASSAY OF MAGNESIUM: CPT | Performed by: PHYSICIAN ASSISTANT

## 2022-06-30 PROCEDURE — 99284 EMERGENCY DEPT VISIT MOD MDM: CPT

## 2022-06-30 PROCEDURE — 71045 X-RAY EXAM CHEST 1 VIEW: CPT

## 2022-06-30 PROCEDURE — 25010000002 DIPHENHYDRAMINE PER 50 MG: Performed by: PHYSICIAN ASSISTANT

## 2022-06-30 RX ORDER — ONDANSETRON 2 MG/ML
4 INJECTION INTRAMUSCULAR; INTRAVENOUS ONCE
Status: COMPLETED | OUTPATIENT
Start: 2022-06-30 | End: 2022-06-30

## 2022-06-30 RX ORDER — SODIUM CHLORIDE 0.9 % (FLUSH) 0.9 %
3-10 SYRINGE (ML) INJECTION AS NEEDED
Status: DISCONTINUED | OUTPATIENT
Start: 2022-06-30 | End: 2022-07-01 | Stop reason: HOSPADM

## 2022-06-30 RX ORDER — PROCHLORPERAZINE EDISYLATE 5 MG/ML
10 INJECTION INTRAMUSCULAR; INTRAVENOUS ONCE
Status: COMPLETED | OUTPATIENT
Start: 2022-06-30 | End: 2022-06-30

## 2022-06-30 RX ORDER — ACETAMINOPHEN 325 MG/1
650 TABLET ORAL EVERY 4 HOURS PRN
Status: DISCONTINUED | OUTPATIENT
Start: 2022-06-30 | End: 2022-07-01 | Stop reason: HOSPADM

## 2022-06-30 RX ORDER — ACETAMINOPHEN 650 MG/1
650 SUPPOSITORY RECTAL EVERY 4 HOURS PRN
Status: DISCONTINUED | OUTPATIENT
Start: 2022-06-30 | End: 2022-07-01 | Stop reason: HOSPADM

## 2022-06-30 RX ORDER — ONDANSETRON 2 MG/ML
4 INJECTION INTRAMUSCULAR; INTRAVENOUS EVERY 6 HOURS PRN
Status: DISCONTINUED | OUTPATIENT
Start: 2022-06-30 | End: 2022-07-01 | Stop reason: HOSPADM

## 2022-06-30 RX ORDER — CHOLECALCIFEROL (VITAMIN D3) 125 MCG
5 CAPSULE ORAL NIGHTLY PRN
Status: DISCONTINUED | OUTPATIENT
Start: 2022-06-30 | End: 2022-07-01 | Stop reason: HOSPADM

## 2022-06-30 RX ORDER — SODIUM CHLORIDE 0.9 % (FLUSH) 0.9 %
3 SYRINGE (ML) INJECTION EVERY 12 HOURS SCHEDULED
Status: DISCONTINUED | OUTPATIENT
Start: 2022-06-30 | End: 2022-07-01 | Stop reason: HOSPADM

## 2022-06-30 RX ORDER — SODIUM CHLORIDE 9 MG/ML
125 INJECTION, SOLUTION INTRAVENOUS CONTINUOUS
Status: DISCONTINUED | OUTPATIENT
Start: 2022-06-30 | End: 2022-07-01 | Stop reason: HOSPADM

## 2022-06-30 RX ORDER — HEPARIN SODIUM 5000 [USP'U]/ML
5000 INJECTION, SOLUTION INTRAVENOUS; SUBCUTANEOUS EVERY 12 HOURS SCHEDULED
Status: DISCONTINUED | OUTPATIENT
Start: 2022-06-30 | End: 2022-07-01 | Stop reason: HOSPADM

## 2022-06-30 RX ORDER — ASPIRIN 81 MG/1
81 TABLET ORAL DAILY
Status: DISCONTINUED | OUTPATIENT
Start: 2022-07-01 | End: 2022-07-01 | Stop reason: HOSPADM

## 2022-06-30 RX ORDER — ACETAMINOPHEN 160 MG/5ML
650 SOLUTION ORAL EVERY 4 HOURS PRN
Status: DISCONTINUED | OUTPATIENT
Start: 2022-06-30 | End: 2022-07-01 | Stop reason: HOSPADM

## 2022-06-30 RX ORDER — NICOTINE 21 MG/24HR
1 PATCH, TRANSDERMAL 24 HOURS TRANSDERMAL
Status: DISCONTINUED | OUTPATIENT
Start: 2022-07-01 | End: 2022-07-01 | Stop reason: HOSPADM

## 2022-06-30 RX ORDER — DIPHENHYDRAMINE HYDROCHLORIDE 50 MG/ML
25 INJECTION INTRAMUSCULAR; INTRAVENOUS ONCE
Status: COMPLETED | OUTPATIENT
Start: 2022-06-30 | End: 2022-06-30

## 2022-06-30 RX ORDER — SODIUM CHLORIDE 0.9 % (FLUSH) 0.9 %
10 SYRINGE (ML) INJECTION AS NEEDED
Status: DISCONTINUED | OUTPATIENT
Start: 2022-06-30 | End: 2022-07-01 | Stop reason: HOSPADM

## 2022-06-30 RX ORDER — ACETAMINOPHEN 325 MG/1
650 TABLET ORAL EVERY 6 HOURS PRN
Status: DISCONTINUED | OUTPATIENT
Start: 2022-06-30 | End: 2022-07-01 | Stop reason: HOSPADM

## 2022-06-30 RX ADMIN — DIPHENHYDRAMINE HYDROCHLORIDE 25 MG: 50 INJECTION INTRAMUSCULAR; INTRAVENOUS at 18:42

## 2022-06-30 RX ADMIN — Medication 5 MG: at 21:11

## 2022-06-30 RX ADMIN — SODIUM CHLORIDE 125 ML/HR: 9 INJECTION, SOLUTION INTRAVENOUS at 21:11

## 2022-06-30 RX ADMIN — LIDOCAINE HYDROCHLORIDE: 20 SOLUTION ORAL; TOPICAL at 21:11

## 2022-06-30 RX ADMIN — PROCHLORPERAZINE EDISYLATE 10 MG: 5 INJECTION INTRAMUSCULAR; INTRAVENOUS at 18:42

## 2022-06-30 RX ADMIN — Medication 3 ML: at 21:10

## 2022-06-30 RX ADMIN — SODIUM CHLORIDE 1000 ML: 9 INJECTION, SOLUTION INTRAVENOUS at 17:21

## 2022-06-30 RX ADMIN — ONDANSETRON 4 MG: 2 INJECTION INTRAMUSCULAR; INTRAVENOUS at 17:21

## 2022-06-30 RX ADMIN — SODIUM CHLORIDE 1000 ML: 9 INJECTION, SOLUTION INTRAVENOUS at 18:42

## 2022-06-30 RX ADMIN — HEPARIN SODIUM 5000 UNITS: 5000 INJECTION INTRAVENOUS; SUBCUTANEOUS at 21:11

## 2022-07-01 ENCOUNTER — READMISSION MANAGEMENT (OUTPATIENT)
Dept: CALL CENTER | Facility: HOSPITAL | Age: 42
End: 2022-07-01

## 2022-07-01 VITALS
WEIGHT: 192.02 LBS | DIASTOLIC BLOOD PRESSURE: 81 MMHG | HEART RATE: 74 BPM | BODY MASS INDEX: 30.86 KG/M2 | SYSTOLIC BLOOD PRESSURE: 126 MMHG | HEIGHT: 66 IN | TEMPERATURE: 97.9 F | RESPIRATION RATE: 18 BRPM | OXYGEN SATURATION: 98 %

## 2022-07-01 LAB
ANION GAP SERPL CALCULATED.3IONS-SCNC: 11.7 MMOL/L (ref 5–15)
BASOPHILS # BLD AUTO: 0.06 10*3/MM3 (ref 0–0.2)
BASOPHILS NFR BLD AUTO: 0.8 % (ref 0–1.5)
BUN SERPL-MCNC: 24 MG/DL (ref 6–20)
BUN/CREAT SERPL: 17.4 (ref 7–25)
CALCIUM SPEC-SCNC: 8.3 MG/DL (ref 8.6–10.5)
CHLORIDE SERPL-SCNC: 107 MMOL/L (ref 98–107)
CK SERPL-CCNC: 1124 U/L (ref 20–200)
CO2 SERPL-SCNC: 19.3 MMOL/L (ref 22–29)
CREAT SERPL-MCNC: 1.38 MG/DL (ref 0.76–1.27)
DEPRECATED RDW RBC AUTO: 41 FL (ref 37–54)
EGFRCR SERPLBLD CKD-EPI 2021: 66.3 ML/MIN/1.73
EOSINOPHIL # BLD AUTO: 0.16 10*3/MM3 (ref 0–0.4)
EOSINOPHIL NFR BLD AUTO: 2 % (ref 0.3–6.2)
ERYTHROCYTE [DISTWIDTH] IN BLOOD BY AUTOMATED COUNT: 13.2 % (ref 12.3–15.4)
GLUCOSE SERPL-MCNC: 94 MG/DL (ref 65–99)
HCT VFR BLD AUTO: 39.4 % (ref 37.5–51)
HGB BLD-MCNC: 13.3 G/DL (ref 13–17.7)
IMM GRANULOCYTES # BLD AUTO: 0.03 10*3/MM3 (ref 0–0.05)
IMM GRANULOCYTES NFR BLD AUTO: 0.4 % (ref 0–0.5)
LYMPHOCYTES # BLD AUTO: 3.03 10*3/MM3 (ref 0.7–3.1)
LYMPHOCYTES NFR BLD AUTO: 37.9 % (ref 19.6–45.3)
MCH RBC QN AUTO: 29.2 PG (ref 26.6–33)
MCHC RBC AUTO-ENTMCNC: 33.8 G/DL (ref 31.5–35.7)
MCV RBC AUTO: 86.4 FL (ref 79–97)
MONOCYTES # BLD AUTO: 0.63 10*3/MM3 (ref 0.1–0.9)
MONOCYTES NFR BLD AUTO: 7.9 % (ref 5–12)
NEUTROPHILS NFR BLD AUTO: 4.09 10*3/MM3 (ref 1.7–7)
NEUTROPHILS NFR BLD AUTO: 51 % (ref 42.7–76)
NRBC BLD AUTO-RTO: 0 /100 WBC (ref 0–0.2)
PLATELET # BLD AUTO: 288 10*3/MM3 (ref 140–450)
PMV BLD AUTO: 9.8 FL (ref 6–12)
POTASSIUM SERPL-SCNC: 4.2 MMOL/L (ref 3.5–5.2)
RBC # BLD AUTO: 4.56 10*6/MM3 (ref 4.14–5.8)
SODIUM SERPL-SCNC: 138 MMOL/L (ref 136–145)
WBC NRBC COR # BLD: 8 10*3/MM3 (ref 3.4–10.8)

## 2022-07-01 PROCEDURE — 25010000002 HEPARIN (PORCINE) PER 1000 UNITS: Performed by: FAMILY MEDICINE

## 2022-07-01 PROCEDURE — 85025 COMPLETE CBC W/AUTO DIFF WBC: CPT | Performed by: FAMILY MEDICINE

## 2022-07-01 PROCEDURE — 99239 HOSP IP/OBS DSCHRG MGMT >30: CPT | Performed by: STUDENT IN AN ORGANIZED HEALTH CARE EDUCATION/TRAINING PROGRAM

## 2022-07-01 PROCEDURE — 82550 ASSAY OF CK (CPK): CPT | Performed by: FAMILY MEDICINE

## 2022-07-01 PROCEDURE — 80048 BASIC METABOLIC PNL TOTAL CA: CPT | Performed by: FAMILY MEDICINE

## 2022-07-01 RX ADMIN — HEPARIN SODIUM 5000 UNITS: 5000 INJECTION INTRAVENOUS; SUBCUTANEOUS at 08:00

## 2022-07-01 RX ADMIN — ASPIRIN 81 MG: 81 TABLET, COATED ORAL at 08:00

## 2022-07-01 RX ADMIN — NICOTINE 1 PATCH: 14 PATCH TRANSDERMAL at 08:01

## 2022-07-01 RX ADMIN — Medication 3 ML: at 08:00

## 2022-07-01 NOTE — ED NOTES
Pt transported per WC to Room 304 at this time. All personal belongings with patient at time of departure from ED.

## 2022-07-01 NOTE — PAYOR COMM NOTE
"TO:[facility]  FROM:MELODY DUMONT, RN PHONE [phone]  INPT NOTIFICATION AND CLINICALS    Sekou Albarran (42 y.o. Male)             Date of Birth   1980    Social Security Number       Address   [address] [place] [id number]    Home Phone   [phone]    MRN   [id number]       Presybeterian   None    Marital Status   Single                            Admission Date   22    Admission Type   Emergency    Admitting Provider   Vidal Rivera MD    Attending Provider   Brianna Verma DO    Department, Room/Bed   [facility] MED SURG  3, 304/1       Discharge Date       Discharge Disposition   Home or Self Care    Discharge Destination                               Attending Provider: Brianna Verma DO    Allergies: No Known Allergies    Isolation: None   Infection: None   Code Status: CPR   Advance Care Planning Activity    Ht: 167.6 cm (66\")   Wt: 87.1 kg (192 lb 0.3 oz)    Admission Cmt: None   Principal Problem: Non-traumatic rhabdomyolysis [M62.82]                 Active Insurance as of 2022     Primary Coverage     Payor Plan Insurance Group Employer/Plan Group    [employer] [place] [employer]ICA[place]      Payor Plan Address Payor Plan Phone Number Payor Plan Fax Number Effective Dates    [address] [address] [phone]  2022 - None Entered    [facility] [address]       Subscriber Name Subscriber Birth Date Member ID       SEKOU ALBARRAN 1980 [id number]                 Emergency Contacts      (Rel.) Home Phone Work Phone Mobile Phone    JT MORGAN (Sister) [phone] -- --               History & Physical      Vidal Rivera MD at [address] [place] [address] HOSPITALIST   HISTORY AND PHYSICAL      Name:  Sekou Albarran   Age:  42 y.o.  Sex:  male  :  1980  MRN:  [id number]   Visit Number:  [id number]  Admission Date:  2022  Date Of Service:  22  Primary Care Physician:  Jaswant" "BOYD Hoover    Chief Complaint:     Vomiting    History Of Presenting Illness:      Patient is a 42 years old male with history of bipolar, depression, hypertension, tobacco abuse and history of drug abuse who presented to the ER with a chief complaint of heat exposure, nausea and vomiting.  Patient was working at a factory in a very hot weather when he started feeling generally weak, sweating and then started to throw up around 1 PM today.  Patient was reporting cramping all over his body.  He states that he got \"overheated\". when asked about chest pain on arrival, he reported that his chest did not feel normal, but he denies any chest pain currently. Patient denies loss of consciousness, vision changes, headaches, shortness of breath, cough, abdominal pain, diarrhea or urinary symptoms.  Patient otherwise denies any recent sickness or illness.    Upon ER evaluation, His vitals were stable and was afebrile.  Labs significant for MQ3821, sodium 133, creatinine 2.78 (baseline around 1.3 previous labs) BUN 34, WBC 15.7.  Lipase, magnesium, proBNP WNL.  Troponin negative x2.  Chest x-ray with no acute disease per my read.  COVID-negative.  Patient received 2 L of IV fluids bolus, his nausea and vomiting were treated with Compazine, Zofran and Benadryl.  Patient already feeling better currently. hospitalist consulted for admission, further evaluation and treatment.    Review Of Systems:    All systems were reviewed and negative except as mentioned in history of presenting illness, assessment and plan.    Past Medical History: Patient  has a past medical history of Bipolar 1 disorder (HCC), Bipolar disorder (HCC), Bipolar disorder (HCC), Depression, Fractures, and History of blood clots.    Past Surgical History: Patient  has a past surgical history that includes Cardiac surgery.    Social History: Patient  reports that he has been smoking cigarettes. He has a 10.00 pack-year smoking history. He uses smokeless " "tobacco. He reports current alcohol use. He reports current drug use. Drugs: Marijuana, Amphetamines, and Methamphetamines.    Family History: Patient's family history includes Heart attack in his mother; Hypertension in his mother; Mental illness in his mother.    Allergies:      Patient has no known allergies.    Home Medications:    Prior to Admission Medications     Prescriptions Last Dose Informant Patient Reported? Taking?    aspirin (aspirin) 81 MG EC tablet   No No    Take 1 tablet by mouth Daily.    lisinopril (PRINIVIL,ZESTRIL) 20 MG tablet   No No    Take 1 tablet by mouth Daily.        ED Medications:    Medications   sodium chloride 0.9 % flush 10 mL (has no administration in time range)   acetaminophen (TYLENOL) tablet 650 mg (has no administration in time range)   aluminum-magnesium hydroxide-simethicone (MAALOX MAX) 400-400-40 MG/5ML 15 mL, Lidocaine Viscous HCl (XYLOCAINE) 2 % 15 mL suspension (has no administration in time range)   sodium chloride 0.9 % bolus 1,000 mL (0 mL Intravenous Stopped 6/30/22 1837)   ondansetron (ZOFRAN) injection 4 mg (4 mg Intravenous Given 6/30/22 1721)   sodium chloride 0.9 % bolus 1,000 mL (1,000 mL Intravenous New Bag 6/30/22 1842)   prochlorperazine (COMPAZINE) injection 10 mg (10 mg Intravenous Given 6/30/22 1842)   diphenhydrAMINE (BENADRYL) injection 25 mg (25 mg Intravenous Given 6/30/22 1842)     Vital Signs:  Temp:  [98.4 °F (36.9 °C)] 98.4 °F (36.9 °C)  Heart Rate:  [75-95] 86  Resp:  [16-18] 16  BP: (101-132)/(61-73) 132/73        06/30/22  1647   Weight: 88.5 kg (195 lb)     Body mass index is 31.47 kg/m².    Physical Exam:     Most recent vital Signs: /73 (BP Location: Left arm)   Pulse 86   Temp 98.4 °F (36.9 °C) (Oral)   Resp 16   Ht 167.6 cm (66\")   Wt 88.5 kg (195 lb)   SpO2 97%   BMI 31.47 kg/m²     Physical Exam  Vitals and nursing note reviewed.   Constitutional:       General: He is not in acute distress.     Appearance: Normal " appearance.   HENT:      Head: Normocephalic and atraumatic.      Right Ear: External ear normal.      Left Ear: External ear normal.      Nose: Nose normal.      Mouth/Throat:      Mouth: Mucous membranes are moist.   Eyes:      Extraocular Movements: Extraocular movements intact.      Conjunctiva/sclera: Conjunctivae normal.      Pupils: Pupils are equal, round, and reactive to light.   Cardiovascular:      Rate and Rhythm: Normal rate and regular rhythm.      Pulses: Normal pulses.      Heart sounds: Normal heart sounds.   Pulmonary:      Effort: Pulmonary effort is normal.      Breath sounds: Normal breath sounds. No wheezing or rhonchi.   Abdominal:      General: Bowel sounds are normal. There is no distension.      Palpations: Abdomen is soft.      Tenderness: There is no abdominal tenderness.   Musculoskeletal:         General: Normal range of motion.      Cervical back: Normal range of motion and neck supple.   Skin:     General: Skin is warm and dry.      Findings: No rash.   Neurological:      General: No focal deficit present.      Mental Status: He is alert and oriented to person, place, and time. Mental status is at baseline.      Motor: No weakness.   Psychiatric:         Mood and Affect: Mood normal.         Behavior: Behavior normal.         Thought Content: Thought content normal.         Laboratory data:    I have reviewed the labs done in the emergency room.    Results from last 7 days   Lab Units 06/30/22  1721   SODIUM mmol/L 133*   POTASSIUM mmol/L 4.1   CHLORIDE mmol/L 97*   CO2 mmol/L 16.1*   BUN mg/dL 34*   CREATININE mg/dL 2.78*   CALCIUM mg/dL 9.8   BILIRUBIN mg/dL 0.6   ALK PHOS U/L 82   ALT (SGPT) U/L 29   AST (SGOT) U/L 52*   GLUCOSE mg/dL 101*     Results from last 7 days   Lab Units 06/30/22  1721   WBC 10*3/mm3 15.76*   HEMOGLOBIN g/dL 15.2   HEMATOCRIT % 42.0   PLATELETS 10*3/mm3 375         Results from last 7 days   Lab Units 06/30/22  1914 06/30/22  1721   CK TOTAL U/L  --   1,676*   TROPONIN T ng/mL <0.010 <0.010     Results from last 7 days   Lab Units 06/30/22  1721   PROBNP pg/mL 71.0         Results from last 7 days   Lab Units 06/30/22  1721   LIPASE U/L 19               Invalid input(s): USDES,  BLOODU, NITRITITE, BACT, EP    Pain Management Panel    There is no flowsheet data to display.         EKG:      Sinus rhythm, heart rate 93, nonspecific ST/T wave changes.    Radiology:    No radiology results for the last 3 days    Assessment:    1. Acute kidney injury, POA  2. Nontraumatic rhabdomyolysis, POA  3. Intractable nausea and vomiting, POA   4. bipolar/depression  5. Hypertension  6. Chronic tobacco abuse  7. Illicit drug abuse    Plan:    Patient was admitted to [facility] for further evaluation and treatment.    Acute kidney injury  Nontraumatic rhabdomyolysis  Likely secondary to dehydration, prerenal and rhabdomyolysis.  Avoid nephrotoxic drugs and hold lisinopril.  Patient received IV fluid boluses in the ER, will continue IV fluids normal saline at 125 mL/hr.  UDS and UA pending.  Monitor kidney function and CK level with a.m. labs.    Nausea and vomiting  Appears to be improving.  Zofran as needed.    Hypertension  Will hold lisinopril due to WING.  Continue to monitor and will use as needed meds for blood pressure control.    Chronic tobacco abuse  Smoking cessation education  Nicotine patch    Illicit drugs abuse  Currently uses THC, reported last used was 1 week ago.  History of methamphetamine abuse, denies any in the past year.    Further orders as indicated per clinical course.    Risk Assessment: Moderate to high  DVT Prophylaxis: Heparin subcu (benefit> risk)  Code Status: Full  Diet: Renal    Advance Care Planning   ACP discussion was held with the patient during this visit. Patient does not have an advance directive, information provided.           Vidal Rivera MD  06/30/22  19:59 EDT    Dictated utilizing Dragon dictation.    Electronically signed by Miguel  MD Vidal at 06/30/22 2130          Emergency Department Notes      Yao Mayen RN at 06/30/22 1720        Pt reminded that a urine sample has been ordered. Pt states that he is unable to provide a urine sample at this time.     Electronically signed by Yao Mayen RN at 06/30/22 1729     Jovana Hunt PA-C at 06/30/22 1726     Attestation signed by Jamshid Nance MD at 07/01/22 0708        SUPERVISE: For this patient encounter, I reviewed the APC's documentation, treatment plan, and medical decision making.  Jamshid Nance MD 7/1/2022 07:07 EDT                         Subjective   Patient is a 42-year-old male with history bipolar disorder, depression, fractures and blood clots presenting to the ER for evaluation of heat exposure, nausea and vomiting.  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  He denies any specific chest pain but states he just does not feel normal.  Denies any fall or head trauma but does have a headache, denies any vision loss or changes.  Denies any productive cough, shortness of breath, severe abdominal pain, diarrhea, bloody bowel movements, dysuria, hematuria, or any other symptoms.          Review of Systems   Constitutional: Positive for fatigue. Negative for chills and fever.   HENT: Negative for congestion, ear pain, nosebleeds, sinus pain, sore throat and tinnitus.    Eyes: Negative.    Respiratory: Negative.    Cardiovascular: Negative.    Gastrointestinal: Positive for nausea and vomiting. Negative for abdominal pain.   Genitourinary: Negative.    Musculoskeletal: Positive for myalgias.   Skin: Negative.    Allergic/Immunologic: Negative for immunocompromised state.   Neurological: Positive for headaches.   Psychiatric/Behavioral: Negative.        Past Medical History:   Diagnosis Date   • Bipolar 1 disorder (HCC)    • Bipolar disorder (HCC)    • Bipolar  "disorder (HCC)    • Depression    • Fractures    • History of blood clots        No Known Allergies    Past Surgical History:   Procedure Laterality Date   • CARDIAC SURGERY         Family History   Problem Relation Age of Onset   • Mental illness Mother    • Hypertension Mother    • Heart attack Mother        Social History     Socioeconomic History   • Marital status: Single   Tobacco Use   • Smoking status: Current Every Day Smoker     Packs/day: 0.50     Years: 20.00     Pack years: 10.00     Types: Cigarettes   • Smokeless tobacco: Current User   Vaping Use   • Vaping Use: Never used   Substance and Sexual Activity   • Alcohol use: Yes     Comment: social   • Drug use: Yes     Types: Marijuana, Amphetamines, Methamphetamines     Comment: Not currently using Meth   • Sexual activity: Defer           Objective   Physical Exam  Vitals and nursing note reviewed.     /82 (BP Location: Left arm, Patient Position: Lying)   Pulse 82   Temp 98.3 °F (36.8 °C) (Oral)   Resp 18   Ht 167.6 cm (66\")   Wt 87.1 kg (192 lb 0.3 oz)   SpO2 98%   BMI 30.99 kg/m²     GEN: No acute distress, sitting up in the stretcher.  He is awake and alert.  He does not appear septic or toxic.  Head: Normocephalic, atraumatic.  Patient does have some diaphoresis on his forehead  Eyes: Pupils equal round reactive to light, EOM intact   ENT: Posterior pharynx normal in appearance, oral mucosa is moist, tongue midline, no facial asymmetry  Chest: Nontender to palpation  Cardiovascular: Regular rate and rhythm  Lungs: Clear to auscultation bilaterally without adventitious sounds  Abdomen: Soft, nontender, nondistended, no peritoneal signs, no guarding  Extremities: No edema, normal appearance, full range of motion without deficits, distal pulses intact  Neuro: GCS 15  Psych: Mood and affect are appropriate    Procedures          ED Course  ED Course as of 06/30/22 2159   Thu Jun 30, 2022   1731 WBC(!): 15.76 [LA]   1731 Hemoglobin: 15.2 " [LA]   1731 Platelets: 375 [LA]   1731 Neutrophil Rel %(!): 78.7 [LA]   1731 Lymphocyte Rel %(!): 12.8 [LA]   1731 Monocyte Rel %: 7.0 [LA]   1731 Eosinophil Rel %: 0.5 [LA]   1731 Basophil Rel %: 0.5 [LA]   1731 Immature Granulocyte Rel %: 0.5 [LA]   1731 Neutrophils Absolute(!): 12.40 [LA]   1731 Lymphocytes Absolute: 2.01 [LA]   1731 Monocytes Absolute(!): 1.11 [LA]   1731 Eosinophils Absolute: 0.08 [LA]   1731 Basophils Absolute: 0.08 [LA]   1731 Immature Grans, Absolute(!): 0.08 [LA]   1731 nRBC: 0.0 [LA]   1737 EKG interpreted by me: Sinus rhythm, normal rate, no acute ST/T changes, this is a normal EKG [MP]   1750 proBNP: 71.0 [LA]   1750 Troponin T: <0.010 [LA]   1750 Lipase: 19 [LA]   1750 Creatine Kinase(!): 1,676 [LA]   1750 Magnesium: 2.4 [LA]   1750 WBC(!): 15.76 [LA]   1750 Hemoglobin: 15.2 [LA]   1750 Platelets: 375 [LA]   1750 Glucose(!): 101 [LA]   1750 BUN(!): 34 [LA]   1750 Creatinine(!): 2.78 [LA]   1750 Sodium(!): 133 [LA]   1750 Potassium: 4.1 [LA]   1750 Chloride(!): 97 [LA]   1750 CO2(!): 16.1 [LA]   1750 Calcium: 9.8 [LA]   1750 Total Protein: 7.7 [LA]   1750 Albumin: 5.00 [LA]   1750 ALT (SGPT): 29 [LA]   1750 AST (SGOT)(!): 52 [LA]   1750 Alkaline Phosphatase: 82 [LA]   1750 Total Bilirubin: 0.6 [LA]   1750 Globulin: 2.7 [LA]   1750 A/G Ratio: 1.9 [LA]   1750 BUN/Creatinine Ratio: 12.2 [LA]   1750 Anion Gap(!): 19.9 [LA]   1750 eGFR(!): 28.6 [LA]   1825 Patient still complains of a headache.  He also states he has heartburn from vomiting.  We will repeat another serial troponin, give GI cocktail.  Patient is agreeable for admission. [LA]   1830 Discussed with Dr. Andrews who accepted patient for admission. [LA]   1835 RN informed the patient is still vomiting.  Will give Compazine and Benadryl [LA]   2008 COVID19: Not Detected [LA]   2008 Troponin T: <0.010 [LA]      ED Course User Index  [LA] Jovana Hunt PA-C  [MP] Jamshid Nance MD KASPER  reviewed by Vidal Rivera MD, Jamshid Nance MD       MDM  Number of Diagnoses or Management Options  WING (acute kidney injury) (HCC)  Dehydration  Heat exposure, initial encounter  Diagnosis management comments: On arrival, patient has a blood pressure of 101/61, afebrile, saturating 97% on room air.  Differential could include dehydration, heat exhaustion, electrolyte abnormalities, cardiac dysrhythmia, and other concerns.  Will obtain basic labs, troponin, proBNP, EKG, chest x-ray, urinalysis, UDS, magnesium and CK.  Will give IV fluids and Zofran.    Patient had an WING creatinine 2.78, up from 1.31 one month ago elevated BUN.  His CK was over 1600. He had a leukocytosis of 15 but no significant anemia.  Patient had been given approximate 500 cc of fluid from EMS and received 2 L of fluid here.  He had not been able to urinate yet.  His troponin and proBNP were not elevated.  He began complaining of heartburn, we did repeat serial troponin and EKG.  Attempted to give GI cocktail but patient was still vomiting so gave Compazine and Benadryl.  Chest x-ray reviewed with attending, did not see any acute cardiopulmonary abnormality.  Given patient's WING, discussed with Dr. Andrews who graciously accepted the patient for admission.       Amount and/or Complexity of Data Reviewed  Clinical lab tests: reviewed and ordered  Tests in the radiology section of CPT®: ordered and reviewed  Decide to obtain previous medical records or to obtain history from someone other than the patient: yes  Review and summarize past medical records: yes  Discuss the patient with other providers: yes    Risk of Complications, Morbidity, and/or Mortality  Presenting problems: moderate  Diagnostic procedures: moderate  Management options: moderate    Patient Progress  Patient progress: stable      Final diagnoses:   WING (acute kidney injury) (HCC)   Dehydration   Heat exposure, initial encounter       ED Disposition  ED Disposition      ED Disposition   Decision to Admit    Condition   --    Comment   Level of Care: Med/Surg [1]   Diagnosis: WING (acute kidney injury) ([facility]) [[id number]]   Admitting Physician: SEAN YOUNG [[id number]]   Attending Physician: SEAN YOUNG [[id number]]   Certification: I Certify That Inpatient Hospital Services Are Medically Necessary For Greater Than 2 Midnights               No follow-up provider specified.       Medication List      No changes were made to your prescriptions during this visit.          Jovana Hunt PA-C  06/30/22 2159      Electronically signed by Jamshid Nance MD at 07/01/22 0708     Carlita Sotelo at 06/30/22 1828        At this time, MINERVA Whaley requested to speak to Dr. Andrews. Call transferred.    Electronically signed by Carlita Sotelo at 06/30/22 1829     Yao Mayen RN at 06/30/22 1830        Pt reports vomiting and remains nauseated at this time. Provider notified per this RN.     Electronically signed by Yao Mayen RN at 06/30/22 1902     Yao Mayen RN at 06/30/22 1845        Pt reminded of the need for urine sample. Pt reports that he is unable to provide a sample at this time.     Electronically signed by Yao Mayen RN at 06/30/22 1858     Yao Mayen RN at 06/30/22 1931        Report given at this time. Pt to be transported to Room 304.     Electronically signed by Yao Mayen RN at 06/30/22 1931     Yao Mayen RN at 06/30/22 2037        Pt transported per WC to Room 304 at this time. All personal belongings with patient at time of departure from ED.     Electronically signed by Yao Mayen RN at 06/30/22 2037       Vital Signs (last day)     Date/Time Temp Temp src Pulse Resp BP Patient Position SpO2    07/01/22 0800 97.9 (36.6) Oral 74 18 126/81 Lying 98    07/01/22 0300 98.3 (36.8) Oral 72 18 101/64 Lying --    06/30/22 2300 98.3 (36.8) Oral 72 18 113/70 Lying --    06/30/22 2104 98.3 (36.8) Oral 82 18 127/82 Lying 98    06/30/22  2030 -- -- 79 16 128/77 -- 97    06/30/22 2015 -- -- 77 -- -- -- 96    06/30/22 2000 -- -- 80 -- -- -- 97    06/30/22 1945 -- -- 95 18 -- -- 96    06/30/22 1930 -- -- 86 -- -- -- 97    06/30/22 1915 -- -- 86 16 132/73 -- 96    06/30/22 1900 -- -- 85 -- -- -- 98    06/30/22 1845 -- -- 82 -- -- -- 99    06/30/22 1830 -- -- 89 -- -- -- 99    06/30/22 1815 -- -- 75 -- -- -- 98    06/30/22 1800 -- -- 83 -- -- -- 99    06/30/22 1745 -- -- 77 -- -- -- 97    06/30/22 1730 -- -- 90 -- 105/64 -- 98    06/30/22 1715 -- -- 84 -- -- -- 97    06/30/22 1700 -- -- 95 -- -- -- 96    06/30/22 1647 98.4 (36.9) Oral 93 18 101/61 -- 97            Current Facility-Administered Medications   Medication Dose Route Frequency Provider Last Rate Last Admin   • acetaminophen (TYLENOL) tablet 650 mg  650 mg Oral Q4H PRN Vidal Rivera MD        Or   • acetaminophen (TYLENOL) 160 MG/5ML solution 650 mg  650 mg Oral Q4H PRN Vidal Rivera MD        Or   • acetaminophen (TYLENOL) suppository 650 mg  650 mg Rectal Q4H PRN Vidal Rivera MD       • acetaminophen (TYLENOL) tablet 650 mg  650 mg Oral Q6H PRN Vidal Rivera MD       • aspirin EC tablet 81 mg  81 mg Oral Daily Vidal Rivera MD   81 mg at 07/01/22 0800   • heparin (porcine) 5000 UNIT/ML injection 5,000 Units  5,000 Units Subcutaneous Q12H Vidal Rivera MD   5,000 Units at 07/01/22 0800   • melatonin tablet 5 mg  5 mg Oral Nightly PRN Vidal Rivera MD   5 mg at 06/30/22 2111   • nicotine (NICODERM CQ) 14 MG/24HR patch 1 patch  1 patch Transdermal Q24H Vidal Rivera MD   1 patch at 07/01/22 0801   • ondansetron (ZOFRAN) injection 4 mg  4 mg Intravenous Q6H PRN Vidal Rivera MD       • sodium chloride 0.9 % flush 10 mL  10 mL Intravenous PRN Vidal Rivera MD       • sodium chloride 0.9 % flush 3 mL  3 mL Intravenous Q12H Vidal Rivera MD   3 mL at 07/01/22 0800   • sodium chloride 0.9 % flush 3-10 mL  3-10 mL Intravenous PRN Vidal Rivera MD       • sodium  chloride 0.9 % infusion  125 mL/hr Intravenous Continuous Vidal Rivera  mL/hr at 06/30/22 2111 125 mL/hr at 06/30/22 2111       Lab Results (last 24 hours)     Procedure Component Value Units Date/Time    Basic Metabolic Panel [[id number]]  (Abnormal) Collected: 07/01/22 0510    Specimen: Blood Updated: 07/01/22 0605     Glucose 94 mg/dL      BUN 24 mg/dL      Creatinine 1.38 mg/dL      Sodium 138 mmol/L      Potassium 4.2 mmol/L      Chloride 107 mmol/L      CO2 19.3 mmol/L      Calcium 8.3 mg/dL      BUN/Creatinine Ratio 17.4     Anion Gap 11.7 mmol/L      eGFR 66.3 mL/min/1.73      Comment: National Kidney Foundation and American Society of Nephrology (ASN) Task Force recommended calculation based on the Chronic Kidney Disease Epidemiology Collaboration (CKD-EPI) equation refit without adjustment for race.       Narrative:      GFR Normal >60  Chronic Kidney Disease <60  Kidney Failure <15      CK [[id number]]  (Abnormal) Collected: 07/01/22 0510    Specimen: Blood Updated: 07/01/22 0602     Creatine Kinase 1,124 U/L     CBC Auto Differential [[id number]]  (Normal) Collected: 07/01/22 0510    Specimen: Blood Updated: 07/01/22 0548     WBC 8.00 10*3/mm3      RBC 4.56 10*6/mm3      Hemoglobin 13.3 g/dL      Hematocrit 39.4 %      MCV 86.4 fL      MCH 29.2 pg      MCHC 33.8 g/dL      RDW 13.2 %      RDW-SD 41.0 fl      MPV 9.8 fL      Platelets 288 10*3/mm3      Neutrophil % 51.0 %      Lymphocyte % 37.9 %      Monocyte % 7.9 %      Eosinophil % 2.0 %      Basophil % 0.8 %      Immature Grans % 0.4 %      Neutrophils, Absolute 4.09 10*3/mm3      Lymphocytes, Absolute 3.03 10*3/mm3      Monocytes, Absolute 0.63 10*3/mm3      Eosinophils, Absolute 0.16 10*3/mm3      Basophils, Absolute 0.06 10*3/mm3      Immature Grans, Absolute 0.03 10*3/mm3      nRBC 0.0 /100 WBC     Urine Drug Screen - Urine, Clean Catch [[id number]]  (Abnormal) Collected: 06/30/22 2057    Specimen: Urine, Clean Catch Updated: 06/30/22 2131      THC, Screen, Urine Positive     Phencyclidine (PCP), Urine Negative     Cocaine Screen, Urine Negative     Methamphetamine, Ur Negative     Opiate Screen Negative     Amphetamine Screen, Urine Negative     Benzodiazepine Screen, Urine Negative     Tricyclic Antidepressants Screen Negative     Methadone Screen, Urine Negative     Barbiturates Screen, Urine Negative     Oxycodone Screen, Urine Negative     Propoxyphene Screen Negative     Buprenorphine, Screen, Urine Negative    Narrative:      Limitations of this procedure include the possibility of false positives due to interfering substances in the urine sample. Clinical data should be correlated with any questionable result. Positive results should be considered Presumptive Positive until results are confirmed with another methodology such as HPLC or GCMS.    Urinalysis, Microscopic Only - Urine, Clean Catch [[id number]]  (Abnormal) Collected: 06/30/22 2057    Specimen: Urine, Clean Catch Updated: 06/30/22 2131     RBC, UA None Seen /HPF      WBC, UA 0-2 /HPF      Bacteria, UA 1+ /HPF      Squamous Epithelial Cells, UA 0-2 /HPF      Hyaline Casts, UA None Seen /LPF      Methodology Manual Light Microscopy    Urinalysis With Microscopic If Indicated (No Culture) - Urine, Clean Catch [[id number]]  (Abnormal) Collected: 06/30/22 2057    Specimen: Urine, Clean Catch Updated: 06/30/22 2123     Color, UA Yellow     Appearance, UA Clear     pH, UA 5.5     Specific Gravity, UA 1.011     Glucose, UA Negative     Ketones, UA 15 mg/dL (1+)     Bilirubin, UA Negative     Blood, UA Trace     Protein, UA 30 mg/dL (1+)     Leuk Esterase, UA Negative     Nitrite, UA Negative     Urobilinogen, UA 0.2 E.U./dL    Troponin [[id number]]  (Normal) Collected: 06/30/22 1914    Specimen: Blood Updated: 06/30/22 1939     Troponin T <0.010 ng/mL      Comment: Specimen hemolyzed.  Results may be affected.       Narrative:      Troponin T Reference Range:  <= 0.03 ng/mL-   Negative for  AMI  >0.03 ng/mL-     Abnormal for myocardial necrosis.  Clinicians would have to utilize clinical acumen, EKG, Troponin and serial changes to determine if it is an Acute Myocardial Infarction or myocardial injury due to an underlying chronic condition.       Results may be falsely decreased if patient taking Biotin.      COVID PRE-OP / PRE-PROCEDURE SCREENING ORDER (NO ISOLATION) - Swab, Nasal Cavity [[id number]]  (Normal) Collected: 06/30/22 1850    Specimen: Swab from Nasal Cavity Updated: 06/30/22 1926    Narrative:      The following orders were created for panel order COVID PRE-OP / PRE-PROCEDURE SCREENING ORDER (NO ISOLATION) - Swab, Nasal Cavity.  Procedure                               Abnormality         Status                     ---------                               -----------         ------                     COVID-19,[facility]...[[id number]]  Normal              Final result                 Please view results for these tests on the individual orders.    COVID-19,[facility] IN-HOUSE,Nasal Swab No Transport Media 3-4 HR TAT - Swab, Nasal Cavity [[id number]]  (Normal) Collected: 06/30/22 1850    Specimen: Swab from Nasal Cavity Updated: 06/30/22 1926     COVID19 Not Detected    Narrative:      Fact sheet for providers: https://www.fda.gov/media/057509/download     Fact sheet for patients: https://www.fda.gov/media/762160/download    Test performed by PCR.    Consider negative results in combination with clinical observations, patient history, and epidemiological information.    BNP [[id number]]  (Normal) Collected: 06/30/22 1721    Specimen: Blood Updated: 06/30/22 1748     proBNP 71.0 pg/mL     Narrative:      Among patients with dyspnea, NT-proBNP is highly sensitive for the detection of acute congestive heart failure. In addition NT-proBNP of <300 pg/ml effectively rules out acute congestive heart failure with 99% negative predictive value.    Results may be falsely decreased if patient taking  Biotin.      Troponin [[id number]]  (Normal) Collected: 06/30/22 1721    Specimen: Blood Updated: 06/30/22 1748     Troponin T <0.010 ng/mL     Narrative:      Troponin T Reference Range:  <= 0.03 ng/mL-   Negative for AMI  >0.03 ng/mL-     Abnormal for myocardial necrosis.  Clinicians would have to utilize clinical acumen, EKG, Troponin and serial changes to determine if it is an Acute Myocardial Infarction or myocardial injury due to an underlying chronic condition.       Results may be falsely decreased if patient taking Biotin.      Lipase [[id number]]  (Normal) Collected: 06/30/22 1721    Specimen: Blood Updated: 06/30/22 1745     Lipase 19 U/L     Comprehensive Metabolic Panel [[id number]]  (Abnormal) Collected: 06/30/22 1721    Specimen: Blood Updated: 06/30/22 1745     Glucose 101 mg/dL      BUN 34 mg/dL      Creatinine 2.78 mg/dL      Sodium 133 mmol/L      Potassium 4.1 mmol/L      Comment: Slight hemolysis detected by analyzer. Results may be affected.        Chloride 97 mmol/L      CO2 16.1 mmol/L      Calcium 9.8 mg/dL      Total Protein 7.7 g/dL      Albumin 5.00 g/dL      ALT (SGPT) 29 U/L      AST (SGOT) 52 U/L      Alkaline Phosphatase 82 U/L      Total Bilirubin 0.6 mg/dL      Globulin 2.7 gm/dL      A/G Ratio 1.9 g/dL      BUN/Creatinine Ratio 12.2     Anion Gap 19.9 mmol/L      eGFR 28.6 mL/min/1.73      Comment: National Kidney Foundation and American Society of Nephrology (ASN) Task Force recommended calculation based on the Chronic Kidney Disease Epidemiology Collaboration (CKD-EPI) equation refit without adjustment for race.       Narrative:      GFR Normal >60  Chronic Kidney Disease <60  Kidney Failure <15      CK [[id number]]  (Abnormal) Collected: 06/30/22 1721    Specimen: Blood Updated: 06/30/22 1745     Creatine Kinase 1,676 U/L     Magnesium [[id number]]  (Normal) Collected: 06/30/22 1721    Specimen: Blood Updated: 06/30/22 1745     Magnesium 2.4 mg/dL     CBC & Differential [[id number]]   (Abnormal) Collected: 06/30/22 1721    Specimen: Blood Updated: 06/30/22 1729    Narrative:      The following orders were created for panel order CBC & Differential.  Procedure                               Abnormality         Status                     ---------                               -----------         ------                     CBC Auto Differential[[id number]]        Abnormal            Final result                 Please view results for these tests on the individual orders.    CBC Auto Differential [[id number]]  (Abnormal) Collected: 06/30/22 1721    Specimen: Blood Updated: 06/30/22 1729     WBC 15.76 10*3/mm3      RBC 5.08 10*6/mm3      Hemoglobin 15.2 g/dL      Hematocrit 42.0 %      MCV 82.7 fL      MCH 29.9 pg      MCHC 36.2 g/dL      RDW 12.9 %      RDW-SD 38.5 fl      MPV 9.8 fL      Platelets 375 10*3/mm3      Neutrophil % 78.7 %      Lymphocyte % 12.8 %      Monocyte % 7.0 %      Eosinophil % 0.5 %      Basophil % 0.5 %      Immature Grans % 0.5 %      Neutrophils, Absolute 12.40 10*3/mm3      Lymphocytes, Absolute 2.01 10*3/mm3      Monocytes, Absolute 1.11 10*3/mm3      Eosinophils, Absolute 0.08 10*3/mm3      Basophils, Absolute 0.08 10*3/mm3      Immature Grans, Absolute 0.08 10*3/mm3      nRBC 0.0 /100 WBC         Imaging Results (Last 24 Hours)     Procedure Component Value Units Date/Time    XR Chest 1 View [[id number]] Collected: 07/01/22 0728     Updated: 07/01/22 0731    Narrative:      CLINICAL INDICATION:    Generalized weakness, vomiting     EXAMINATION TECHNIQUE:   XR CHEST 1 VW-     COMPARISON:  None.     FINDINGS:  Lungs are clear without evidence of focal airspace consolidation. No  pneumothorax. No pleural effusion. Heart and mediastinal contours are  within normal limits. No acute osseous or soft tissue abnormalities. No  free air under the hemidiaphragms.       Impression:      No acute cardiopulmonary findings.     Images personally reviewed, interpreted and dictated by Conor  RAFAEL Sosa.                This report was signed and finalized on 7/1/2022 7:29 AM by RAFAEL Butler.        Physician Progress Notes (last 24 hours)  Notes from 06/30/22 0902 through 07/01/22 0902   No notes of this type exist for this encounter.         Consult Notes (last 24 hours)  Notes from 06/30/22 0902 through 07/01/22 0902   No notes of this type exist for this encounter.

## 2022-07-01 NOTE — OUTREACH NOTE
Prep Survey    Flowsheet Row Responses   [facility] facility patient discharged from? [facility]   Is LACE score < 7 ? Yes   Emergency Room discharge w/ pulse ox? No   Eligibility [facility]   Date of Admission 06/30/22   Date of Discharge 07/01/22   Discharge Disposition Home or Self Care   Discharge diagnosis WING, nontraumatic rhabdomyolysis, intractable N/V   Does the patient have one of the following disease processes/diagnoses(primary or secondary)? Other   Does the patient have Home health ordered? No   Is there a DME ordered? No   Prep survey completed? Yes          JOVANY MENSAH - Registered Nurse

## 2022-07-01 NOTE — CASE MANAGEMENT/SOCIAL WORK
Continued Stay Note   [facility]     Patient Name: Sekou Hackett  MRN: [id number]  Today's Date: 7/1/2022    Admit Date: 6/30/2022     Discharge Plan     Row Name 07/01/22 1026       Plan    Plan per SW; pcab set up and plans to be here in 30 minutes; informed rn and md    Row Name 07/01/22 1021       Plan    Plan pt resting in bed; stated plans to go home on d/c; will need assist getting home because hasn't picked up paycard from [employer] this week; informed sw; stated lives with sister and her spouse and no issues; no lw/poa and no info wanted; no other needs noted               Discharge Codes    No documentation.               Expected Discharge Date and Time     Expected Discharge Date Expected Discharge Time    Jul 1, 2022             Teresa Irvin RN

## 2022-07-01 NOTE — DISCHARGE INSTR - ACTIVITY
-Continue to drink plenty of fluids  -Avoid heat exposure, overheating, exercise, strenuous activity  -Hold Lisinopril for the next 48 hours  -Follow up with PCP in 1 week

## 2022-07-01 NOTE — DISCHARGE SUMMARY
"    [facility]      Name:  Sekou Hackett   Age:  42 y.o.  Sex:  male  :  1980  MRN:  [id number]   Visit Number:  [id number]    Admission Date:  2022  Date of Discharge:  2022  Primary Care Physician:  Berta Michaud APRN    Important issues to note:    -Continue to drink plenty of fluids  -Avoid heat exposure, overheating, exercise, strenuous activity  -Hold Lisinopril for the next 48 hours  -Follow up with PCP in 1 week    Discharge Diagnoses:     1. Acute kidney injury, POA  2. Nontraumatic rhabdomyolysis, POA  3. Intractable nausea and vomiting, POA   4. bipolar/depression  5. Hypertension  6. Chronic tobacco abuse  7. Illicit drug abuse      Problem List:     Active Hospital Problems    Diagnosis  POA   • **Non-traumatic rhabdomyolysis [M62.82]  Unknown   • WING (acute kidney injury) ([facility]) [N17.9]  Yes   • Primary hypertension [I10]  Yes      Resolved Hospital Problems   No resolved problems to display.     Presenting Problem:    Chief Complaint   Patient presents with   • Heat Exposure   • Vomiting   • Weakness - Generalized      Consults:     Consulting Physician(s)             None          Procedures Performed:        History of presenting illness/Hospital Course:    Patient is a 42 years old male with history of bipolar, depression, hypertension, tobacco abuse and history of drug abuse who presented to the ER with a chief complaint of heat exposure, nausea and vomiting.  Patient was working at a factory in a very hot weather when he started feeling generally weak, sweating and then started to throw up around 1 PM today.  Patient was reporting cramping all over his body.  He states that he got \"overheated\". when asked about chest pain on arrival, he reported that his chest did not feel normal, but he denies any chest pain currently. Patient denies loss of consciousness, vision changes, headaches, shortness of breath, cough, abdominal " pain, diarrhea or urinary symptoms.  Patient otherwise denies any recent sickness or illness.     Upon ER evaluation, His vitals were stable and was afebrile.  Labs significant for FF0080, sodium 133, creatinine 2.78 (baseline around 1.3 previous labs) BUN 34, WBC 15.7.  Lipase, magnesium, proBNP WNL.  Troponin negative x2.  Chest x-ray with no acute disease per my read.  COVID-negative.  Patient received 2 L of IV fluids bolus, his nausea and vomiting were treated with Compazine, Zofran and Benadryl.  Patient already feeling better currently. hospitalist consulted for admission, further evaluation and treatment.    Patient received IV fluids throughout the evening. On morning of discharge, patient's creatinine was improved and returned to baseline. Creatinine kinase improved. Patient stated no further cramping/pain in extremities and requested being discharged home. With improvement in laboratory values and increased urine output, patient was deemed medically stable for discharge home. He was instructed to continue to drink significant fluids over the next couple days and to avoid overheating/heat exposure, exercise, strenuous activity.    Patient is to follow up with his PCP in 1 week for repeat BMP and CK to assess renal function. He is to hold his Lisinopril for the next 48 hours.    Vital Signs:    Temp:  [98.3 °F (36.8 °C)-98.4 °F (36.9 °C)] 98.3 °F (36.8 °C)  Heart Rate:  [72-95] 72  Resp:  [16-18] 18  BP: (101-132)/(61-82) 101/64    Physical Exam:    General Appearance:  Alert and cooperative. In no acute distress.   Head:  Atraumatic and normocephalic.   Eyes: Conjunctivae and sclerae normal, no icterus. No pallor.                   Lungs:   Breath sounds heard bilaterally equally.  No crackles or wheezing.    Heart:  Normal S1 and S2, no murmur, no gallop, no rub. No JVD.   Abdomen:   Normal bowel sounds, no masses, no organomegaly. Soft, nontender, nondistended.   Extremities: Supple, no edema, no  cyanosis, no clubbing.   Pulses: Pulses palpable bilaterally.   Skin: No bleeding or rash.   Neurologic: Alert and oriented x 3. No facial asymmetry. Moves all four limbs.      Pertinent Lab Results:     Results from last 7 days   Lab Units 07/01/22  0510 06/30/22  1721   SODIUM mmol/L 138 133*   POTASSIUM mmol/L 4.2 4.1   CHLORIDE mmol/L 107 97*   CO2 mmol/L 19.3* 16.1*   BUN mg/dL 24* 34*   CREATININE mg/dL 1.38* 2.78*   CALCIUM mg/dL 8.3* 9.8   BILIRUBIN mg/dL  --  0.6   ALK PHOS U/L  --  82   ALT (SGPT) U/L  --  29   AST (SGOT) U/L  --  52*   GLUCOSE mg/dL 94 101*     Results from last 7 days   Lab Units 07/01/22  0510 06/30/22  1721   WBC 10*3/mm3 8.00 15.76*   HEMOGLOBIN g/dL 13.3 15.2   HEMATOCRIT % 39.4 42.0   PLATELETS 10*3/mm3 288 375         Results from last 7 days   Lab Units 07/01/22  0510 06/30/22  1914 06/30/22  1721   CK TOTAL U/L 1,124*  --  1,676*   TROPONIN T ng/mL  --  <0.010 <0.010     Results from last 7 days   Lab Units 06/30/22  1721   PROBNP pg/mL 71.0         Results from last 7 days   Lab Units 06/30/22  1721   LIPASE U/L 19               Pertinent Radiology Results:    Imaging Results (All)     Procedure Component Value Units Date/Time    XR Chest 1 View [[id number]] Collected: 07/01/22 0728     Updated: 07/01/22 0731    Narrative:      CLINICAL INDICATION:    Generalized weakness, vomiting     EXAMINATION TECHNIQUE:   XR CHEST 1 VW-     COMPARISON:  None.     FINDINGS:  Lungs are clear without evidence of focal airspace consolidation. No  pneumothorax. No pleural effusion. Heart and mediastinal contours are  within normal limits. No acute osseous or soft tissue abnormalities. No  free air under the hemidiaphragms.       Impression:      No acute cardiopulmonary findings.     Images personally reviewed, interpreted and dictated by RAFAEL Butler.                This report was signed and finalized on 7/1/2022 7:29 AM by RAFAEL Butler.          Echo:      Condition on  Discharge:      Stable.    Code status during the hospital stay:    Code Status and Medical Interventions:   Ordered at: 06/30/22 1958     Code Status (Patient has no pulse and is not breathing):    CPR (Attempt to Resuscitate)     Medical Interventions (Patient has pulse or is breathing):    Full Support     Discharge Disposition:    Home or Self Care    Discharge Medications:       Discharge Medications      Continue These Medications      Instructions Start Date   aspirin 81 MG EC tablet   81 mg, Oral, Daily      lisinopril 20 MG tablet  Commonly known as: PRINIVIL,ZESTRIL   20 mg, Oral, Daily           Discharge Diet:     As tolerated, LOTS of Fluids    Activity at Discharge:     As tolerated, avoid heat/overheating    Follow-up Appointments:     Follow-up Information     Berta Michaud APRN Follow up in 1 week(s).    Specialties: Family Medicine, Nurse Practitioner, Family Medicine  Contact information:  [address] [place] [address] [phone]                       Future Appointments   Date Time Provider Department Center   7/13/2022 10:30 AM Ector Leslie MD [facility]   8/9/2022  1:00 PM Melissa Lofton APRN [facility]     Test Results Pending at Discharge:           Brianna Verma DO  07/01/22  08:54 EDT    Time: I spent 33 minutes on this discharge activity which included: face-to-face encounter with the patient, reviewing the data in the system, coordination of the care with the nursing staff as well as consultants, documentation, and entering orders.     Dictated utilizing Dragon dictation.

## 2022-07-01 NOTE — CASE MANAGEMENT/SOCIAL WORK
Case Management Discharge Note                Selected Continued Care - Discharged on 7/1/2022 Admission date: 6/30/2022 - Discharge disposition: Home or Self Care     Transportation Services  Taxi: other (pcab)    Final Discharge Disposition Code: 01 - home or self-care

## 2022-07-01 NOTE — PLAN OF CARE
Goal Outcome Evaluation:  Plan of Care Reviewed With: patient clear for discharge today per attending-education provided along with instructions for medications and follow ups.        Progress: improving

## 2022-07-01 NOTE — CASE MANAGEMENT/SOCIAL WORK
Discharge Planning Assessment  [facility]     Patient Name: Sekou Hackett  MRN: [id number]  Today's Date: 7/1/2022    Admit Date: 6/30/2022     Discharge Needs Assessment     Row Name 07/01/22 1008       Living Environment    People in Home sibling(s)    Name(s) of People in Home lives with sister and her     Current Living Arrangements apartment    Primary Care Provided by self    Provides Primary Care For no one    Family Caregiver if Needed sibling(s)    Able to Return to Prior Arrangements yes    Living Arrangement Comments plans home on d/c; stated will need assist getting home, has used Pc[facility] before; informed SW       Resource/Environmental Concerns    Resource/Environmental Concerns none    Transportation Concerns no car       Transition Planning    Patient/Family Anticipates Transition to home with family    Patient/Family Anticipated Services at Transition none    Transportation Anticipated family or friend will provide;health plan transportation       Discharge Needs Assessment    Readmission Within the Last 30 Days no previous admission in last 30 days    Equipment Currently Used at Home none    Concerns to be Addressed discharge planning    Anticipated Changes Related to Illness none    Equipment Needed After Discharge none               Discharge Plan     Row Name 07/01/22 1021       Plan    Plan pt resting in bed; stated plans to go home on d/c; will need assist getting home because hasn't picked up paycard from [employer] this week; informed sw; stated lives with sister and her spouse and no issues; no lw/poa and no info wanted; no other needs noted              Continued Care and Services - Admitted Since 6/30/2022    Coordination has not been started for this encounter.       Expected Discharge Date and Time     Expected Discharge Date Expected Discharge Time    Jul 1, 2022          Demographic Summary     Row Name 07/01/22 1007       General Information    Admission Type inpatient     Arrived From emergency department    Referral Source admission list    Reason for Consult discharge planning    Preferred Language English               Functional Status     Row Name 07/01/22 1008       Functional Status    Usual Activity Tolerance moderate    Current Activity Tolerance moderate       Functional Status, IADL    Medications independent    Meal Preparation independent    Housekeeping independent    Laundry independent    Shopping independent       Mental Status    General Appearance WDL WDL       Mental Status Summary    Recent Changes in Mental Status/Cognitive Functioning no changes       Employment/    Employment Status employed full-time    Employment/ Comments works [employer]                    Teresa Irvin RN

## 2022-07-01 NOTE — PLAN OF CARE
Goal Outcome Evaluation:              Outcome Evaluation: New Admit. Acute Kidney Injury.  VSS.  IV fluids initiated.  Will continue to monitor

## 2022-07-04 ENCOUNTER — TRANSITIONAL CARE MANAGEMENT TELEPHONE ENCOUNTER (OUTPATIENT)
Dept: CALL CENTER | Facility: HOSPITAL | Age: 42
End: 2022-07-04

## 2022-07-04 NOTE — OUTREACH NOTE
Call Center TCM Note    Flowsheet Row Responses   [facility] patient discharged from? [facility]   Does the patient have one of the following disease processes/diagnoses(primary or secondary)? Other   TCM attempt successful? Yes   Call start time 1308   Call end time 1309   Discharge diagnosis WING, nontraumatic rhabdomyolysis, intractable N/V   Person spoke with today (if not patient) and relationship Irene, sister Amee reviewed with patient/caregiver? Yes   Is the patient having any side effects they believe may be caused by any medication additions or changes? No   Does the patient have all medications ordered at discharge? N/A   Is the patient taking all medications as directed (includes completed medication regime)? Yes   Does the patient have a primary care provider?  Yes   Does the patient have an appointment with their PCP within 7 days of discharge? Yes   Comments regarding [facility] fu appt on 7/5/22 at 8:15 AM   Has the patient kept scheduled appointments due by today? N/A   Psychosocial issues? No   Did the patient receive a copy of their discharge instructions? Yes   Nursing interventions Reviewed instructions with patient   What is the patient's perception of their health status since discharge? Improving   Is the patient/caregiver able to teach back signs and symptoms related to disease process for when to call PCP? Yes   Is the patient/caregiver able to teach back signs and symptoms related to disease process for when to call 911? Yes   Is the patient/caregiver able to teach back the hierarchy of who to call/visit for symptoms/problems? PCP, Specialist, Home health nurse, Urgent Care, ED, 911 Yes   If the patient is a current smoker, are they able to teach back resources for cessation? Not a smoker   TCM call completed? Yes   Wrap up additional comments Irene states pt is doing ok. Irene verified [facility] fu appt on 7/5/22. No questions/concerns.          Dannielle Ramírez  RN    7/4/2022, 13:09 EDT

## 2022-07-14 ENCOUNTER — TELEPHONE (OUTPATIENT)
Dept: CARDIOLOGY | Facility: CLINIC | Age: 42
End: 2022-07-14

## 2022-08-06 ENCOUNTER — HOSPITAL ENCOUNTER (EMERGENCY)
Facility: HOSPITAL | Age: 42
Discharge: LEFT BEFORE DISCHARGE | End: 2022-08-06

## 2022-08-06 VITALS
HEART RATE: 70 BPM | WEIGHT: 192.2 LBS | TEMPERATURE: 98.5 F | RESPIRATION RATE: 17 BRPM | HEIGHT: 66 IN | BODY MASS INDEX: 30.89 KG/M2 | OXYGEN SATURATION: 98 % | DIASTOLIC BLOOD PRESSURE: 111 MMHG | SYSTOLIC BLOOD PRESSURE: 146 MMHG

## 2022-08-06 PROCEDURE — 99211 OFF/OP EST MAY X REQ PHY/QHP: CPT

## 2022-08-12 ENCOUNTER — LAB (OUTPATIENT)
Dept: LAB | Facility: HOSPITAL | Age: 42
End: 2022-08-12

## 2022-08-12 ENCOUNTER — TELEMEDICINE (OUTPATIENT)
Dept: INTERNAL MEDICINE | Facility: CLINIC | Age: 42
End: 2022-08-12

## 2022-08-12 DIAGNOSIS — B34.9 VIRAL INFECTION: ICD-10-CM

## 2022-08-12 DIAGNOSIS — R11.2 NAUSEA VOMITING AND DIARRHEA: ICD-10-CM

## 2022-08-12 DIAGNOSIS — Z20.822 CLOSE EXPOSURE TO COVID-19 VIRUS: Primary | ICD-10-CM

## 2022-08-12 DIAGNOSIS — R19.7 NAUSEA VOMITING AND DIARRHEA: ICD-10-CM

## 2022-08-12 PROCEDURE — U0004 COV-19 TEST NON-CDC HGH THRU: HCPCS | Performed by: FAMILY MEDICINE

## 2022-08-12 PROCEDURE — C9803 HOPD COVID-19 SPEC COLLECT: HCPCS

## 2022-08-12 PROCEDURE — 99213 OFFICE O/P EST LOW 20 MIN: CPT | Performed by: FAMILY MEDICINE

## 2022-08-12 RX ORDER — ONDANSETRON 8 MG/1
8 TABLET, ORALLY DISINTEGRATING ORAL EVERY 8 HOURS PRN
Qty: 20 TABLET | Refills: 0 | Status: SHIPPED | OUTPATIENT
Start: 2022-08-12

## 2022-08-12 NOTE — PROGRESS NOTES
Sekou Hackett is a 42 y.o. male.    Chief Complaint   Patient presents with   • Cough   • Fever   • Headache   • Abstract     Feels tingling in his legs and lower part of arms.   Symptoms started wed of last week.   Needs work note, & maybe a PCR ordered.    • Vomiting   • Diarrhea     During today's visit, I reviewed the documented allergies, medications, chief complaint, and pertinent vitals.  I have confirmed with the patient that there have been no changes since this information was discussed with my clinical team member.      HPI   Patient reports they have not been feeling well for 1week(s). He admits to rhinorrhea, fever, drainage, productive.  Shortness of breath has improved. Admits to vomiting and diarrhea.  No vomiting since yesterday. Staying hydrated.  They have tried nothing for this issue without good response.  Had 2 PCR tests that were negative and inconclusive.  Sister and her son have had COVID and has been directly exposed.  He works at a local restaurant.      The following portions of the patient's history were reviewed and updated as appropriate: allergies, current medications, past family history, past medical history, past social history, past surgical history and problem list.     No Known Allergies      Current Outpatient Medications:   •  aspirin (aspirin) 81 MG EC tablet, Take 1 tablet by mouth Daily., Disp: 90 tablet, Rfl: 3  •  lisinopril (PRINIVIL,ZESTRIL) 20 MG tablet, Take 1 tablet by mouth Daily., Disp: 30 tablet, Rfl: 1  •  ondansetron ODT (Zofran ODT) 8 MG disintegrating tablet, Place 1 tablet on the tongue Every 8 (Eight) Hours As Needed for Nausea or Vomiting., Disp: 20 tablet, Rfl: 0    ROS    Review of Systems   Constitutional: Positive for fever.   HENT: Positive for postnasal drip and rhinorrhea.    Respiratory: Positive for cough. Negative for shortness of breath.    Gastrointestinal: Positive for diarrhea, nausea and vomiting.       There were no vitals filed for this  visit.  There is no height or weight on file to calculate BMI.    Physical Exam     Physical Exam  Constitutional:       General: He is not in acute distress.     Appearance: He is well-developed. He is diaphoretic.   HENT:      Head: Normocephalic and atraumatic.   Eyes:      Extraocular Movements: Extraocular movements intact.      Conjunctiva/sclera: Conjunctivae normal.   Pulmonary:      Effort: Pulmonary effort is normal. No respiratory distress.   Skin:     Coloration: Skin is not pale.   Neurological:      Mental Status: He is alert and oriented to person, place, and time.      Cranial Nerves: No cranial nerve deficit.   Psychiatric:         Mood and Affect: Mood normal.         Behavior: Behavior normal.         Assessment/Plan    Problems Addressed this Visit    None     Visit Diagnoses     Close exposure to COVID-19 virus    -  Primary    Relevant Orders    COVID-19 PCR, [facility], NP SWAB IN [facility] TRANSPORT MEDIA/ORAL SWISH 24-30 HR TAT - Swab, Nasopharynx    Viral infection        Relevant Orders    COVID-19 PCR, [facility], NP SWAB IN [facility]SP[facility] MEDIA/ORAL SWISH 24-30 HR TAT - Swab, Nasopharynx    Nausea vomiting and diarrhea            Will obtain PCR COVID test due to symptoms and direct exposure.  May take zofran prn nausea.  Encouraged to start dayquil and nyquil for additional symptomatic relief.  Work excuse provided.  Advised can fax to work place.  Patient to call back with fax number.     New Medications Ordered This Visit   Medications   • ondansetron ODT (Zofran ODT) 8 MG disintegrating tablet     Sig: Place 1 tablet on the tongue Every 8 (Eight) Hours As Needed for Nausea or Vomiting.     Dispense:  20 tablet     Refill:  0       No orders of the defined types were placed in this encounter.      Return if symptoms worsen or fail to improve.    Irma Cantu,

## 2022-08-13 LAB — SARS-COV-2 RNA NOSE QL NAA+PROBE: DETECTED

## 2022-10-19 ENCOUNTER — APPOINTMENT (OUTPATIENT)
Dept: CT IMAGING | Facility: HOSPITAL | Age: 42
End: 2022-10-19

## 2022-10-19 ENCOUNTER — HOSPITAL ENCOUNTER (EMERGENCY)
Facility: HOSPITAL | Age: 42
Discharge: HOME | End: 2022-10-19
Attending: EMERGENCY MEDICINE | Admitting: EMERGENCY MEDICINE

## 2022-10-19 VITALS
WEIGHT: 190 LBS | SYSTOLIC BLOOD PRESSURE: 140 MMHG | HEIGHT: 66 IN | RESPIRATION RATE: 18 BRPM | HEART RATE: 89 BPM | TEMPERATURE: 98.3 F | BODY MASS INDEX: 30.53 KG/M2 | DIASTOLIC BLOOD PRESSURE: 88 MMHG | OXYGEN SATURATION: 98 %

## 2022-10-19 DIAGNOSIS — F99 PSYCHIATRIC COMPLAINT: ICD-10-CM

## 2022-10-19 DIAGNOSIS — F32.A DEPRESSION, UNSPECIFIED DEPRESSION TYPE: Primary | ICD-10-CM

## 2022-10-19 DIAGNOSIS — R51.9 NONINTRACTABLE HEADACHE, UNSPECIFIED CHRONICITY PATTERN, UNSPECIFIED HEADACHE TYPE: ICD-10-CM

## 2022-10-19 LAB
ALBUMIN SERPL-MCNC: 5.2 G/DL (ref 3.5–5.2)
ALBUMIN/GLOB SERPL: 1.9 G/DL
ALP SERPL-CCNC: 83 U/L (ref 39–117)
ALT SERPL W P-5'-P-CCNC: 16 U/L (ref 1–41)
AMPHET+METHAMPHET UR QL: NEGATIVE
AMPHETAMINES UR QL: NEGATIVE
ANION GAP SERPL CALCULATED.3IONS-SCNC: 12 MMOL/L (ref 5–15)
APAP SERPL-MCNC: <5 MCG/ML (ref 0–30)
AST SERPL-CCNC: 19 U/L (ref 1–40)
BARBITURATES UR QL SCN: NEGATIVE
BASOPHILS # BLD AUTO: 0.07 10*3/MM3 (ref 0–0.2)
BASOPHILS NFR BLD AUTO: 0.7 % (ref 0–1.5)
BENZODIAZ UR QL SCN: NEGATIVE
BILIRUB SERPL-MCNC: 0.5 MG/DL (ref 0–1.2)
BILIRUB UR QL STRIP: NEGATIVE
BUN SERPL-MCNC: 10 MG/DL (ref 6–20)
BUN/CREAT SERPL: 10.1 (ref 7–25)
BUPRENORPHINE SERPL-MCNC: NEGATIVE NG/ML
CALCIUM SPEC-SCNC: 10 MG/DL (ref 8.6–10.5)
CANNABINOIDS SERPL QL: POSITIVE
CHLORIDE SERPL-SCNC: 101 MMOL/L (ref 98–107)
CLARITY UR: CLEAR
CO2 SERPL-SCNC: 24 MMOL/L (ref 22–29)
COCAINE UR QL: NEGATIVE
COLOR UR: YELLOW
CREAT SERPL-MCNC: 0.99 MG/DL (ref 0.76–1.27)
DEPRECATED RDW RBC AUTO: 38.2 FL (ref 37–54)
EGFRCR SERPLBLD CKD-EPI 2021: 98.8 ML/MIN/1.73
EOSINOPHIL # BLD AUTO: 0.08 10*3/MM3 (ref 0–0.4)
EOSINOPHIL NFR BLD AUTO: 0.8 % (ref 0.3–6.2)
ERYTHROCYTE [DISTWIDTH] IN BLOOD BY AUTOMATED COUNT: 12.5 % (ref 12.3–15.4)
ETHANOL BLD-MCNC: <10 MG/DL (ref 0–10)
ETHANOL UR QL: <0.01 %
GLOBULIN UR ELPH-MCNC: 2.7 GM/DL
GLUCOSE SERPL-MCNC: 98 MG/DL (ref 65–99)
GLUCOSE UR STRIP-MCNC: NEGATIVE MG/DL
HCT VFR BLD AUTO: 47.2 % (ref 37.5–51)
HGB BLD-MCNC: 16.3 G/DL (ref 13–17.7)
HGB UR QL STRIP.AUTO: NEGATIVE
IMM GRANULOCYTES # BLD AUTO: 0.02 10*3/MM3 (ref 0–0.05)
IMM GRANULOCYTES NFR BLD AUTO: 0.2 % (ref 0–0.5)
KETONES UR QL STRIP: NEGATIVE
LEUKOCYTE ESTERASE UR QL STRIP.AUTO: NEGATIVE
LYMPHOCYTES # BLD AUTO: 2.81 10*3/MM3 (ref 0.7–3.1)
LYMPHOCYTES NFR BLD AUTO: 29.4 % (ref 19.6–45.3)
MCH RBC QN AUTO: 29.2 PG (ref 26.6–33)
MCHC RBC AUTO-ENTMCNC: 34.5 G/DL (ref 31.5–35.7)
MCV RBC AUTO: 84.6 FL (ref 79–97)
METHADONE UR QL SCN: NEGATIVE
MONOCYTES # BLD AUTO: 0.61 10*3/MM3 (ref 0.1–0.9)
MONOCYTES NFR BLD AUTO: 6.4 % (ref 5–12)
NEUTROPHILS NFR BLD AUTO: 5.96 10*3/MM3 (ref 1.7–7)
NEUTROPHILS NFR BLD AUTO: 62.5 % (ref 42.7–76)
NITRITE UR QL STRIP: NEGATIVE
NRBC BLD AUTO-RTO: 0 /100 WBC (ref 0–0.2)
OPIATES UR QL: NEGATIVE
OXYCODONE UR QL SCN: NEGATIVE
PCP UR QL SCN: NEGATIVE
PH UR STRIP.AUTO: 7 [PH] (ref 5–8)
PLATELET # BLD AUTO: 364 10*3/MM3 (ref 140–450)
PMV BLD AUTO: 9.5 FL (ref 6–12)
POTASSIUM SERPL-SCNC: 4.2 MMOL/L (ref 3.5–5.2)
PROPOXYPH UR QL: NEGATIVE
PROT SERPL-MCNC: 7.9 G/DL (ref 6–8.5)
PROT UR QL STRIP: NEGATIVE
RBC # BLD AUTO: 5.58 10*6/MM3 (ref 4.14–5.8)
SALICYLATES SERPL-MCNC: 0.6 MG/DL
SARS-COV-2 RNA PNL SPEC NAA+PROBE: NOT DETECTED
SODIUM SERPL-SCNC: 137 MMOL/L (ref 136–145)
SP GR UR STRIP: <=1.005 (ref 1–1.03)
TRICYCLICS UR QL SCN: NEGATIVE
UROBILINOGEN UR QL STRIP: NORMAL
WBC NRBC COR # BLD: 9.55 10*3/MM3 (ref 3.4–10.8)

## 2022-10-19 PROCEDURE — 82077 ASSAY SPEC XCP UR&BREATH IA: CPT | Performed by: PHYSICIAN ASSISTANT

## 2022-10-19 PROCEDURE — 93005 ELECTROCARDIOGRAM TRACING: CPT | Performed by: PHYSICIAN ASSISTANT

## 2022-10-19 PROCEDURE — 36415 COLL VENOUS BLD VENIPUNCTURE: CPT

## 2022-10-19 PROCEDURE — 81003 URINALYSIS AUTO W/O SCOPE: CPT | Performed by: PHYSICIAN ASSISTANT

## 2022-10-19 PROCEDURE — 80143 DRUG ASSAY ACETAMINOPHEN: CPT | Performed by: PHYSICIAN ASSISTANT

## 2022-10-19 PROCEDURE — 80179 DRUG ASSAY SALICYLATE: CPT | Performed by: PHYSICIAN ASSISTANT

## 2022-10-19 PROCEDURE — 70450 CT HEAD/BRAIN W/O DYE: CPT

## 2022-10-19 PROCEDURE — 99283 EMERGENCY DEPT VISIT LOW MDM: CPT

## 2022-10-19 PROCEDURE — 85025 COMPLETE CBC W/AUTO DIFF WBC: CPT | Performed by: PHYSICIAN ASSISTANT

## 2022-10-19 PROCEDURE — 80306 DRUG TEST PRSMV INSTRMNT: CPT | Performed by: PHYSICIAN ASSISTANT

## 2022-10-19 PROCEDURE — 80053 COMPREHEN METABOLIC PANEL: CPT | Performed by: PHYSICIAN ASSISTANT

## 2022-10-19 PROCEDURE — 87635 SARS-COV-2 COVID-19 AMP PRB: CPT | Performed by: PHYSICIAN ASSISTANT

## 2022-10-19 RX ORDER — ACETAMINOPHEN 500 MG
1000 TABLET ORAL ONCE
Status: COMPLETED | OUTPATIENT
Start: 2022-10-19 | End: 2022-10-19

## 2022-10-19 RX ADMIN — ACETAMINOPHEN 1000 MG: 500 TABLET, FILM COATED ORAL at 17:40

## 2022-10-19 RX ADMIN — MUPIROCIN 1 APPLICATION: 20 OINTMENT TOPICAL at 15:04

## 2022-10-19 NOTE — CONSULTS
"Sekou Hackett  1980    Preferred Pronouns:    Time Called for Assessment: 02:56 PM    Assessment Start and End: 03:55 PM- 04:10 PM     Orientation: alert and oriented to person, place, and time     Is patient agreeable to admission/treatment? Yes    Guardian Name/Contact/etc: Patient reports he makes own decisions.     Pt Lives With: Patient reports he lives in [place] with his sister. Patient reports he is not employed. Patient reports he has 5 children (3 live with their mother, 1 is adopted, and 1 in foster care).     Highest Level of Education: Unknown to clinician.      Presenting Problems: Patient reports he visited the ED because he has a place on his chest and patient reports he is off of his bipolar medications.     Mood: anxious and depressed     Current Stressors: Unknown to clinician.     Depression: Patient during assessment rated depression as an 8 or 9 on a 1:10 scale (1-low).      Hopelessness: Patient during assessment rated feelings of hopelessness as a 3-4 on a 1:10 scale (1-low).     Anxiety: Patient during assessment rated feelings of anxiety as a 2 or 3 on a 1:10 scale (1-low).     Sleep: Patient reports sleep is \"not good\".     Appetite: Patient reports he is \"eating alright\".     Delusions: None displayed during assessment.      Hallucinations: When asked about history of hallucinations, patient stated \"probably\".     Homicidal Ideations: Patient during assessment denied current or history of homicidal thoughts or plan or intent to hurt others. Patient reports he does have a history of violence that includes “going off”, with last instance of this being a couple of weeks ago.      Current Mental Healthcare: Patient reports he does not have a therapist. Patient reports he does not have a prescriber for his Bipolar medications. Patient reports he does have a PCP, Dr. Michaud. Patient reports he takes a blood pressure medication and baby Aspirin.     Current Psychiatric Medications: " Patient reports he was previously on Depakote 250 mg. Patient reports that this was prescribed at a mental health hospital.     Hx of Psychiatric Treatment: Patient reports one mental health hospitalization at [facility] this year.     Number of admissions and most recent inpatient admission: Patient reports one mental health hospitalization at [facility] this year.     Last outpatient visit: Unknown to clinician.       COLUMBIA-SUICIDE SEVERITY RATING SCALE  Psychiatric Inpatient Setting - Discharge Screener    Ask questions that are bold and underlined Discharge   Ask Questions 1 and 2 YES NO   1) Wish to be Dead:   Person endorses thoughts about a wish to be dead or not alive anymore, or wish to fall asleep and not wake up.  While you were here in the hospital, have you wished you were dead or wished you could go to sleep and not wake up?  X   2) Suicidal Thoughts:   General non-specific thoughts of wanting to end one's life/die by suicide, “I've thought about killing myself” without general thoughts of ways to kill oneself/associated methods, intent, or plan.   While you were here in the hospital, have you actually had thoughts about killing yourself?   X   If YES to 2, ask questions 3, 4, 5, and 6.  If NO to 2, go directly to question 6   3) Suicidal Thoughts with Method (without Specific Plan or Intent to Act):   Person endorses thoughts of suicide and has thought of a least one method during the assessment period. This is different than a specific plan with time, place or method details worked out. “I thought about taking an overdose but I never made a specific plan as to when where or how I would actually do it….and I would never go through with it.”   Have you been thinking about how you might kill yourself?      4) Suicidal Intent (without Specific Plan):   Active suicidal thoughts of killing oneself and patient reports having some intent to act on such thoughts, as opposed to “I have the thoughts  but I definitely will not do anything about them.”   Have you had these thoughts and had some intention of acting on them or do you have some intention of acting on them after you leave the hospital?      5) Suicide Intent with Specific Plan:   Thoughts of killing oneself with details of plan fully or partially worked out and person has some intent to carry it out.   Have you started to work out or worked out the details of how to kill yourself either for while you were here in the hospital or for after you leave the hospital? Do you intend to carry out this plan?        6) Suicide Behavior    While you were here in the hospital, have you done anything, started to do anything, or prepared to do anything to end your life?    Examples: Took pills, cut yourself, tried to hang yourself, took out pills but didn't swallow any because you changed your mind or someone took them from you, collected pills, secured a means of obtaining a gun, gave away valuables, wrote a will or suicide note, etc.  X     Suicidal: Patient during assessment denied suicidal thoughts, plan or intent to hurt self, or death wishes currently or since being in the hospital. Patient reports history of suicidal thoughts with last time being several years ago. Patient reports no actual suicide attempts, but patient reports he was going to jump off of a bridge a couple of years ago.     Previous Attempts: Patient reports no actual suicide attempts, but patient reports he was going to jump off of a bridge a couple of years ago.     Most Recent Attempt: Patient reports no actual suicide attempts, but patient reports he was going to jump off of a bridge a couple of years ago.    HISTORY:    Trauma/Abuse History: Unknown to clinician.      Does this require reporting: N/A    Legal History / History of Violence: Patient reports he is not on probation or parole. Patient reports he does have a history of violence that includes “going off”, with last instance of  this being a couple of weeks ago.     Family Hx of Mental Health/Substance Abuse: Unknown to clinician.      History of Inappropriate Sexual Behavior: Unknown to clinician.       Substance Use History: Patient reports history of substance use. Patient reports history of marijuana use. Patient reports he uses marijuana 2-4 times per week with last use being yesterday. Patient reports history of meth use with last use being over a year ago. Patient reports history of pain pill use with last use being a “long time ago”. Patient reports history of Xanax use with last use being years ago.     History of Seizures: Patient denied.     Current Medical Conditions or Biomedical Complications: Patient reports head is hurting and chest was hurting.       DATA:   This therapist received a call from [facility] staff STEVEN Veliz for a behavioral health consult.  The patient is agreeable to speak with the behavioral health team.  Met with patient at bedside. Patient is not under 1:1 security monitoring during assessment.  Patient is a 42 year old, , male residing in [place], [place].     Patient reports he lives in [place] with his sister. Patient reports he is not employed. Patient reports he visited the ED because he has a place on his chest and patient reports he is off of his bipolar medications. Patient during assessment rated depression as an 8 or 9 on a 1:10 scale (1-low).  Patient during assessment rated feelings of hopelessness as a 3-4 on a 1:10 scale (1-low). Patient during assessment rated feelings of anxiety as a 2 or 3 on a 1:10 scale (1-low).     Patient during assessment denied suicidal thoughts, plan or intent to hurt self, or death wishes currently or since being in the hospital. Patient reports history of suicidal thoughts with last time being several years ago. Patient reports no actual suicide attempts, but patient reports he was going to jump off of a bridge a couple of years ago.     Patient  "during assessment denied current or history of homicidal thoughts or plan or intent to hurt others. Patient reports he does have a history of violence that includes “going off”, with last instance of this being a couple of weeks ago.     When asked about history of hallucinations, patient stated \"probably\".    Patient reports history of substance use. Patient reports history of marijuana use. Patient reports he uses marijuana 2-4 times per week with last use being yesterday. Patient reports history of meth use with last use being over a year ago. Patient reports history of pain pill use with last use being a “long time ago”. Patient reports history of Xanax use with last use being years ago.     Safety plan of report to police or hospital or call 911 if feeling unsafe, if having suicidal or homicidal thoughts, or if in emergency need of medications verbally reviewed with patient during assessment and suicide prevention hotline number verbally provided to patient during assessment, patient during assessment verbally agreed to safety plan.     Clinician spoke with front office staff at [facility] and patient was scheduled for initial med appointment for 10/20/2022 at 10:00 AM. Appointment paperwork as left at patient’s bedside.     ASSESSMENT:    Therapist completed CSSRS with patient for suicide risk assessment.  The results of patient’s CSSRS suggest that patient is moderate risk for suicide as evidenced by patient during assessment denied suicidal thoughts, plan or intent to hurt self, or death wishes currently or since being in the hospital. Patient reports history of suicidal thoughts with last time being several years ago. Patient reports no actual suicide attempts, but patient reports he was going to jump off of a bridge a couple of years ago.     Patient holds attention and is Cooperative with assessment.  Patient’s appearance is appropriate.  The patient displays Appropriate " psychomotor behavior. The patient's affect appears normal. The patient is observed to have normal rate, tone and rhythm of speech.   Patient observed to have Good eye contact. The patient's displays fair insight, with fair impulse control and fair judgement.     PLAN:    At this time, therapist recommends outpatient treatment based upon Patient during assessment denied suicidal thoughts, plan or intent to hurt self, or death wishes currently or since being in the hospital. Patient during assessment denied current or history of homicidal thoughts or plan or intent to hurt others.     Safety plan of report to police or hospital or call 911 if feeling unsafe, if having suicidal or homicidal thoughts, or if in emergency need of medications verbally reviewed with patient during assessment and suicide prevention hotline number verbally provided to patient during assessment, patient during assessment verbally agreed to safety plan.     Clinician spoke with front office staff at [facility] and patient was scheduled for initial med appointment for 10/20/2022 at 10:00 AM. Appointment paperwork as left at patient’s bedside.     STEVEN Veliz updated regarding case.     -Tyson Warner LCSW  10/19/2022  04:59 PM.

## 2022-10-19 NOTE — ED NOTES
Pt states that he would like to speak to  and to have his medication adjusted. Pt states that he doesn't want to hurt himself or others he just wants to be put back on medication for his Bipolar.

## 2022-10-19 NOTE — ED PROVIDER NOTES
"Subjective   History of Present Illness  Patient is a 42-year-old male with history of bipolar disorder, depression, and reported heart attack when she states a heart cath was performed to remove a blood clot.  He presents today with complaints of headaches, memory lapses, depression.  Patient states that he was on Depakote but has not had it since sometime before August of this year.  He states that he does not feel right and feels that he needs to be back on his medication in order to function properly.  He states that he has been severely depressed but does not want to harm himself or others and does not wish to be placed inpatient.  He states that he did have a mental breakdown in the past and had to go to a psychiatric hospital.  He also reports history of meth use in the past but states he has not used anything other than marijuana lately.  He states he does smoke marijuana every couple of days.  He states that he would like to have a head CT as he states that something is wrong with him.  He also complains of a spot on his chest which he states he thought it was a pimple but it burst and is now painful.  When asked if he has been having hallucinations, he states he does not know.  Denies additional symptoms or complaints at this time.    Review of Systems   Neurological: Positive for headaches.   Psychiatric/Behavioral: Positive for dysphoric mood.        \"memory lapses\"   All other systems reviewed and are negative.      Past Medical History:   Diagnosis Date   • Bipolar 1 disorder (HCC)    • Bipolar disorder (HCC)    • Bipolar disorder (HCC)    • Depression    • Fractures    • History of blood clots        No Known Allergies    Past Surgical History:   Procedure Laterality Date   • CARDIAC SURGERY         Family History   Problem Relation Age of Onset   • Mental illness Mother    • Hypertension Mother    • Heart attack Mother        Social History     Socioeconomic History   • Marital status: Single "   Tobacco Use   • Smoking status: Every Day     Packs/day: 0.50     Years: 20.00     Pack years: 10.00     Types: Cigarettes   • Smokeless tobacco: Current   Vaping Use   • Vaping Use: Never used   Substance and Sexual Activity   • Alcohol use: Yes     Comment: social   • Drug use: Yes     Types: Marijuana, Amphetamines, Methamphetamines     Comment: Not currently using Meth   • Sexual activity: Defer           Objective   Physical Exam  Vitals and nursing note reviewed.   Constitutional:       General: He is not in acute distress.     Appearance: He is not toxic-appearing.   HENT:      Head: Normocephalic and atraumatic.      Right Ear: External ear normal.      Left Ear: External ear normal.      Nose: Nose normal.   Eyes:      Extraocular Movements: Extraocular movements intact.      Conjunctiva/sclera: Conjunctivae normal.   Cardiovascular:      Rate and Rhythm: Tachycardia present.   Pulmonary:      Effort: Pulmonary effort is normal. No respiratory distress.   Abdominal:      General: There is no distension.      Palpations: Abdomen is soft.   Musculoskeletal:         General: Normal range of motion.      Cervical back: Normal range of motion and neck supple.   Skin:     General: Skin is warm and dry.      Comments: Very small circular abrasion to the chest, consistent with a pimple that has been popped; no induration or abscess appreciated   Neurological:      General: No focal deficit present.      Mental Status: He is alert and oriented to person, place, and time.   Psychiatric:         Attention and Perception: He does not perceive auditory or visual hallucinations.         Mood and Affect: Mood is anxious. Affect is tearful.         Speech: Speech normal.         Behavior: Behavior is cooperative.         Thought Content: Thought content does not include homicidal or suicidal ideation. Thought content does not include homicidal or suicidal plan.         Cognition and Memory: Memory is not impaired.          Procedures           ED Course  ED Course as of 10/19/22 1700   Wed Oct 19, 2022   1457 Spoke with Tyson from Behavioral Health who states they will be down to evaluate the patient. [AP]   1525 EKG interpreted by me.  Sinus rhythm.  Rate of 79.  No ST segment or T wave changes.  Normal EKG [CG]      ED Course User Index  [AP] Keren Barajas PA-C  [CG] Kaleb Chaudhary,            Lab Results (last 24 hours)     Procedure Component Value Units Date/Time    CBC & Differential [[id number]]  (Normal) Collected: 10/19/22 1501    Specimen: Blood Updated: 10/19/22 1510    Narrative:      The following orders were created for panel order CBC & Differential.  Procedure                               Abnormality         Status                     ---------                               -----------         ------                     CBC Auto Differential[[id number]]        Normal              Final result                 Please view results for these tests on the individual orders.    Comprehensive Metabolic Panel [[id number]] Collected: 10/19/22 1501    Specimen: Blood Updated: 10/19/22 1522     Glucose 98 mg/dL      BUN 10 mg/dL      Creatinine 0.99 mg/dL      Sodium 137 mmol/L      Potassium 4.2 mmol/L      Chloride 101 mmol/L      CO2 24.0 mmol/L      Calcium 10.0 mg/dL      Total Protein 7.9 g/dL      Albumin 5.20 g/dL      ALT (SGPT) 16 U/L      AST (SGOT) 19 U/L      Alkaline Phosphatase 83 U/L      Total Bilirubin 0.5 mg/dL      Globulin 2.7 gm/dL      A/G Ratio 1.9 g/dL      BUN/Creatinine Ratio 10.1     Anion Gap 12.0 mmol/L      eGFR 98.8 mL/min/1.73      Comment: National Kidney Foundation and American Society of Nephrology (ASN) Task Force recommended calculation based on the Chronic Kidney Disease Epidemiology Collaboration (CKD-EPI) equation refit without adjustment for race.       Narrative:      GFR Normal >60  Chronic Kidney Disease <60  Kidney Failure <15      Acetaminophen Level [[id number]]  (Normal)  Collected: 10/19/22 1501    Specimen: Blood Updated: 10/19/22 1522     Acetaminophen <5.0 mcg/mL     Narrative:      Toxic = Greater than 150 mcg/mL    Ethanol [[id number]] Collected: 10/19/22 1501    Specimen: Blood Updated: 10/19/22 1522     Ethanol <10 mg/dL      Ethanol % <0.010 %     Narrative:      This result is for medical use only and should not be used for forensic purposes.    Salicylate Level [[id number]]  (Normal) Collected: 10/19/22 1501    Specimen: Blood Updated: 10/19/22 1522     Salicylate 0.6 mg/dL     CBC Auto Differential [[id number]]  (Normal) Collected: 10/19/22 1501    Specimen: Blood Updated: 10/19/22 1510     WBC 9.55 10*3/mm3      RBC 5.58 10*6/mm3      Hemoglobin 16.3 g/dL      Hematocrit 47.2 %      MCV 84.6 fL      MCH 29.2 pg      MCHC 34.5 g/dL      RDW 12.5 %      RDW-SD 38.2 fl      MPV 9.5 fL      Platelets 364 10*3/mm3      Neutrophil % 62.5 %      Lymphocyte % 29.4 %      Monocyte % 6.4 %      Eosinophil % 0.8 %      Basophil % 0.7 %      Immature Grans % 0.2 %      Neutrophils, Absolute 5.96 10*3/mm3      Lymphocytes, Absolute 2.81 10*3/mm3      Monocytes, Absolute 0.61 10*3/mm3      Eosinophils, Absolute 0.08 10*3/mm3      Basophils, Absolute 0.07 10*3/mm3      Immature Grans, Absolute 0.02 10*3/mm3      nRBC 0.0 /100 WBC     COVID-19,[facility],Nasal Swab No Transport Media 3-4 HR TAT - Swab, Nasal Cavity [[id number]]  (Normal) Collected: 10/19/22 1505    Specimen: Swab from Nasal Cavity Updated: 10/19/22 1541     COVID19 Not Detected    Narrative:      Fact sheet for providers: https://www.fda.gov/media/645236/download     Fact sheet for patients: https://www.fda.gov/media/836955/download    Test performed by PCR.    Consider negative results in combination with clinical observations, patient history, and epidemiological information.    Urinalysis With Microscopic If Indicated (No Culture) - Urine, Clean Catch [[id number]]  (Normal) Collected: 10/19/22 1603    Specimen:  Urine, Clean Catch Updated: 10/19/22 1610     Color, UA Yellow     Appearance, UA Clear     pH, UA 7.0     Specific Gravity, UA <=1.005     Glucose, UA Negative     Ketones, UA Negative     Bilirubin, UA Negative     Blood, UA Negative     Protein, UA Negative     Leuk Esterase, UA Negative     Nitrite, UA Negative     Urobilinogen, UA 0.2 E.U./dL    Narrative:      Urine microscopic not indicated.    Urine Drug Screen - Urine, Clean Catch [[id number]]  (Abnormal) Collected: 10/19/22 1604    Specimen: Urine, Clean Catch Updated: 10/19/22 1622     THC, Screen, Urine Positive     Phencyclidine (PCP), Urine Negative     Cocaine Screen, Urine Negative     Methamphetamine, Ur Negative     Opiate Screen Negative     Amphetamine Screen, Urine Negative     Benzodiazepine Screen, Urine Negative     Tricyclic Antidepressants Screen Negative     Methadone Screen, Urine Negative     Barbiturates Screen, Urine Negative     Oxycodone Screen, Urine Negative     Propoxyphene Screen Negative     Buprenorphine, Screen, Urine Negative    Narrative:      Limitations of this procedure include the possibility of false positives due to interfering substances in the urine sample. Clinical data should be correlated with any questionable result. Positive results should be considered Presumptive Positive until results are confirmed with another methodology such as HPLC or GCMS.             CT Head Without Contrast    Result Date: 10/19/2022  PROCEDURE: CT HEAD WO CONTRAST-  HISTORY: headaches, memory lapses  TECHNIQUE: Noncontrast exam  FINDINGS: Brain parenchyma is homogeneous without evidence of hemorrhage, mass effect or edema. No extra-axial abnormality is noted. Ventricles and cisterns appear normal.  The visualized sinuses, orbits and petrous temporal bones appear unremarkable.      Impression: Unremarkable unenhanced CT of the brain.   This study was performed with techniques to keep radiation doses as low as reasonably achievable  "(PATRIZIA). Individualized dose reduction techniques using automated exposure control or adjustment of vA and/or kV according to the patient size were employed.  This report was signed and finalized on 10/19/2022 3:47 PM by Danis Corrales MD.                     BP (!) 144/106 (BP Location: Left arm, Patient Position: Sitting)   Pulse 111   Temp 98.3 °F (36.8 °C) (Oral)   Resp 18   Ht 167.6 cm (66\")   Wt 86.2 kg (190 lb)   SpO2 96%   BMI 30.67 kg/m²             MDM   Patient was evaluated in the ER for depression and headaches.  He wanted to be evaluated by behavioral health as he has history of bipolar disorder and has been out of his medications.  He is hemodynamically stable and nontoxic-appearing on exam.  Labs and work-up are unremarkable.  Patient did wish to have a head CT as he stated he has not felt right and has been having memory lapses.  Head CT was unremarkable per radiology.  UDS is positive for THC.  Patient was evaluated by behavioral health and they have gotten him an outpatient appointment for tomorrow to discuss medication therapy.  He did not wish to do inpatient therapy and adamantly denies suicidal and homicidal ideations.  Patient did also note a small pimple that had burst on his chest.  This was cleaned with alcohol and mupirocin was placed on it.  He was advised on wound care.  He is agreeable with plan for discharge.  Precautions were given for return to the ER for any new or worsening symptoms.    Final diagnoses:   Depression, unspecified depression type   Psychiatric complaint   Nonintractable headache, unspecified chronicity pattern, unspecified headache type       ED Disposition  ED Disposition     ED Disposition   Discharge    Condition   Stable    Comment   --             Berta Michaud, APRN  [address] [address] [phone]    Schedule an appointment as soon as possible for a visit   for further outpatient evaluation as needed    [facility] " [place] [facility]  [address] [facility] [address] [phone]  Go to   As needed, If symptoms worsen         Medication List      No changes were made to your prescriptions during this visit.          Keren Barajas PA-C  10/19/22 7487

## 2022-10-19 NOTE — DISCHARGE INSTRUCTIONS
Follow-up for your outpatient psychiatric appointment tomorrow as instructed by behavioral health team.  Take Tylenol as needed per directions on the package for headache.  Follow-up with your PCP for further outpatient evaluation if symptoms persist.  Return to the ER for new or worsening symptoms or acute concerns.

## 2022-10-20 ENCOUNTER — OFFICE VISIT (OUTPATIENT)
Dept: PSYCHIATRY | Facility: CLINIC | Age: 42
End: 2022-10-20

## 2022-10-20 ENCOUNTER — TELEPHONE (OUTPATIENT)
Dept: EMERGENCY DEPT | Facility: HOSPITAL | Age: 42
End: 2022-10-20

## 2022-10-20 VITALS
HEIGHT: 66 IN | DIASTOLIC BLOOD PRESSURE: 78 MMHG | SYSTOLIC BLOOD PRESSURE: 136 MMHG | BODY MASS INDEX: 31.5 KG/M2 | WEIGHT: 196 LBS | HEART RATE: 75 BPM

## 2022-10-20 DIAGNOSIS — F12.10 MILD CANNABIS USE DISORDER: ICD-10-CM

## 2022-10-20 DIAGNOSIS — F31.81 BIPOLAR II DISORDER: Primary | ICD-10-CM

## 2022-10-20 PROCEDURE — 90792 PSYCH DIAG EVAL W/MED SRVCS: CPT | Performed by: NURSE PRACTITIONER

## 2022-10-20 RX ORDER — DIVALPROEX SODIUM 250 MG/1
250 TABLET, DELAYED RELEASE ORAL 2 TIMES DAILY
Qty: 60 TABLET | Refills: 0 | Status: SHIPPED | OUTPATIENT
Start: 2022-10-20

## 2022-10-20 RX ORDER — RISPERIDONE 0.5 MG/1
0.5 TABLET ORAL 2 TIMES DAILY
Qty: 60 TABLET | Refills: 0 | Status: SHIPPED | OUTPATIENT
Start: 2022-10-20
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
## 2022-10-21 ENCOUNTER — HOSPITAL ENCOUNTER (EMERGENCY)
Facility: HOSPITAL | Age: 42
Discharge: HOME | End: 2022-10-21
Attending: EMERGENCY MEDICINE | Admitting: EMERGENCY MEDICINE

## 2022-10-21 VITALS
DIASTOLIC BLOOD PRESSURE: 120 MMHG | HEART RATE: 112 BPM | WEIGHT: 190 LBS | RESPIRATION RATE: 18 BRPM | SYSTOLIC BLOOD PRESSURE: 180 MMHG | OXYGEN SATURATION: 96 % | BODY MASS INDEX: 30.53 KG/M2 | TEMPERATURE: 97.3 F | HEIGHT: 66 IN

## 2022-10-21 DIAGNOSIS — T14.91XA SUICIDAL BEHAVIOR WITH ATTEMPTED SELF-INJURY: Primary | ICD-10-CM

## 2022-10-21 DIAGNOSIS — S41.112A LACERATION OF LEFT UPPER EXTREMITY, INITIAL ENCOUNTER: ICD-10-CM

## 2022-10-21 DIAGNOSIS — Z00.8 MEDICAL CLEARANCE FOR PSYCHIATRIC ADMISSION: ICD-10-CM

## 2022-10-21 LAB
ALBUMIN SERPL-MCNC: 5 G/DL (ref 3.5–5.2)
ALBUMIN/GLOB SERPL: 1.6 G/DL
ALP SERPL-CCNC: 81 U/L (ref 39–117)
ALT SERPL W P-5'-P-CCNC: 18 U/L (ref 1–41)
AMPHET+METHAMPHET UR QL: NEGATIVE
AMPHETAMINES UR QL: NEGATIVE
ANION GAP SERPL CALCULATED.3IONS-SCNC: 14.4 MMOL/L (ref 5–15)
APAP SERPL-MCNC: <5 MCG/ML (ref 0–30)
AST SERPL-CCNC: 21 U/L (ref 1–40)
BARBITURATES UR QL SCN: NEGATIVE
BASOPHILS # BLD AUTO: 0.06 10*3/MM3 (ref 0–0.2)
BASOPHILS NFR BLD AUTO: 0.6 % (ref 0–1.5)
BENZODIAZ UR QL SCN: NEGATIVE
BILIRUB SERPL-MCNC: 0.7 MG/DL (ref 0–1.2)
BILIRUB UR QL STRIP: NEGATIVE
BUN SERPL-MCNC: 9 MG/DL (ref 6–20)
BUN/CREAT SERPL: 8.5 (ref 7–25)
BUPRENORPHINE SERPL-MCNC: NEGATIVE NG/ML
CALCIUM SPEC-SCNC: 10 MG/DL (ref 8.6–10.5)
CANNABINOIDS SERPL QL: NEGATIVE
CHLORIDE SERPL-SCNC: 99 MMOL/L (ref 98–107)
CLARITY UR: CLEAR
CO2 SERPL-SCNC: 21.6 MMOL/L (ref 22–29)
COCAINE UR QL: NEGATIVE
COLOR UR: YELLOW
CREAT SERPL-MCNC: 1.06 MG/DL (ref 0.76–1.27)
DEPRECATED RDW RBC AUTO: 38.2 FL (ref 37–54)
EGFRCR SERPLBLD CKD-EPI 2021: 91 ML/MIN/1.73
EOSINOPHIL # BLD AUTO: 0 10*3/MM3 (ref 0–0.4)
EOSINOPHIL NFR BLD AUTO: 0 % (ref 0.3–6.2)
ERYTHROCYTE [DISTWIDTH] IN BLOOD BY AUTOMATED COUNT: 12.4 % (ref 12.3–15.4)
ETHANOL BLD-MCNC: <10 MG/DL (ref 0–10)
ETHANOL UR QL: <0.01 %
GLOBULIN UR ELPH-MCNC: 3.2 GM/DL
GLUCOSE SERPL-MCNC: 130 MG/DL (ref 65–99)
GLUCOSE UR STRIP-MCNC: NEGATIVE MG/DL
HCT VFR BLD AUTO: 48.6 % (ref 37.5–51)
HGB BLD-MCNC: 16.7 G/DL (ref 13–17.7)
HGB UR QL STRIP.AUTO: NEGATIVE
IMM GRANULOCYTES # BLD AUTO: 0.03 10*3/MM3 (ref 0–0.05)
IMM GRANULOCYTES NFR BLD AUTO: 0.3 % (ref 0–0.5)
KETONES UR QL STRIP: NEGATIVE
LEUKOCYTE ESTERASE UR QL STRIP.AUTO: NEGATIVE
LYMPHOCYTES # BLD AUTO: 1.95 10*3/MM3 (ref 0.7–3.1)
LYMPHOCYTES NFR BLD AUTO: 19 % (ref 19.6–45.3)
MAGNESIUM SERPL-MCNC: 2.5 MG/DL (ref 1.6–2.6)
MCH RBC QN AUTO: 29.1 PG (ref 26.6–33)
MCHC RBC AUTO-ENTMCNC: 34.4 G/DL (ref 31.5–35.7)
MCV RBC AUTO: 84.8 FL (ref 79–97)
METHADONE UR QL SCN: NEGATIVE
MONOCYTES # BLD AUTO: 0.61 10*3/MM3 (ref 0.1–0.9)
MONOCYTES NFR BLD AUTO: 5.9 % (ref 5–12)
NEUTROPHILS NFR BLD AUTO: 7.61 10*3/MM3 (ref 1.7–7)
NEUTROPHILS NFR BLD AUTO: 74.2 % (ref 42.7–76)
NITRITE UR QL STRIP: NEGATIVE
NRBC BLD AUTO-RTO: 0 /100 WBC (ref 0–0.2)
OPIATES UR QL: NEGATIVE
OXYCODONE UR QL SCN: NEGATIVE
PCP UR QL SCN: NEGATIVE
PH UR STRIP.AUTO: 6.5 [PH] (ref 5–8)
PLATELET # BLD AUTO: 359 10*3/MM3 (ref 140–450)
PMV BLD AUTO: 9.2 FL (ref 6–12)
POTASSIUM SERPL-SCNC: 3.9 MMOL/L (ref 3.5–5.2)
PROPOXYPH UR QL: NEGATIVE
PROT SERPL-MCNC: 8.2 G/DL (ref 6–8.5)
PROT UR QL STRIP: NEGATIVE
RBC # BLD AUTO: 5.73 10*6/MM3 (ref 4.14–5.8)
SALICYLATES SERPL-MCNC: <0.3 MG/DL
SARS-COV-2 RNA PNL SPEC NAA+PROBE: NOT DETECTED
SODIUM SERPL-SCNC: 135 MMOL/L (ref 136–145)
SP GR UR STRIP: <=1.005 (ref 1–1.03)
TRICYCLICS UR QL SCN: NEGATIVE
UROBILINOGEN UR QL STRIP: NORMAL
WBC NRBC COR # BLD: 10.26 10*3/MM3 (ref 3.4–10.8)

## 2022-10-21 PROCEDURE — 80179 DRUG ASSAY SALICYLATE: CPT | Performed by: PHYSICIAN ASSISTANT

## 2022-10-21 PROCEDURE — 81003 URINALYSIS AUTO W/O SCOPE: CPT | Performed by: PHYSICIAN ASSISTANT

## 2022-10-21 PROCEDURE — 80143 DRUG ASSAY ACETAMINOPHEN: CPT | Performed by: PHYSICIAN ASSISTANT

## 2022-10-21 PROCEDURE — 80053 COMPREHEN METABOLIC PANEL: CPT | Performed by: PHYSICIAN ASSISTANT

## 2022-10-21 PROCEDURE — C9803 HOPD COVID-19 SPEC COLLECT: HCPCS

## 2022-10-21 PROCEDURE — 85025 COMPLETE CBC W/AUTO DIFF WBC: CPT | Performed by: PHYSICIAN ASSISTANT

## 2022-10-21 PROCEDURE — 93005 ELECTROCARDIOGRAM TRACING: CPT | Performed by: PHYSICIAN ASSISTANT

## 2022-10-21 PROCEDURE — 99284 EMERGENCY DEPT VISIT MOD MDM: CPT

## 2022-10-21 PROCEDURE — 82077 ASSAY SPEC XCP UR&BREATH IA: CPT | Performed by: PHYSICIAN ASSISTANT

## 2022-10-21 PROCEDURE — 83735 ASSAY OF MAGNESIUM: CPT | Performed by: PHYSICIAN ASSISTANT

## 2022-10-21 PROCEDURE — 87635 SARS-COV-2 COVID-19 AMP PRB: CPT | Performed by: PHYSICIAN ASSISTANT

## 2022-10-21 PROCEDURE — 36415 COLL VENOUS BLD VENIPUNCTURE: CPT

## 2022-10-21 PROCEDURE — 80306 DRUG TEST PRSMV INSTRMNT: CPT | Performed by: PHYSICIAN ASSISTANT

## 2022-10-21 RX ORDER — LIDOCAINE HYDROCHLORIDE AND EPINEPHRINE 10; 10 MG/ML; UG/ML
10 INJECTION, SOLUTION INFILTRATION; PERINEURAL ONCE
Status: COMPLETED | OUTPATIENT
Start: 2022-10-21 | End: 2022-10-21

## 2022-10-21 RX ADMIN — LIDOCAINE HYDROCHLORIDE,EPINEPHRINE BITARTRATE 10 ML: 10; .01 INJECTION, SOLUTION INFILTRATION; PERINEURAL at 15:02

## 2022-10-21 NOTE — ED PROVIDER NOTES
Subjective  History of Present Illness:    Chief Complaint: Suicidal/self injury  History of Present Illness: 42-year-old male brought in with [place] [employer], they report that the will call to a residence in regards to the patient after he was found with a laceration on his left wrist that he admitted he did to himself trying to harm himself or commit suicide.  The patient reports that he is depressed and that he cut his wrist due to the depression.  He states he has a history of depression, but he would not elaborate much further than that.  He is very quiet and withdrawn and not answering many questions.  Onset: Sudden  Duration: Just prior to arrival.  Exacerbating / Alleviating factors: Self injury cut left wrist  Associated symptoms: Depression, suicidal      Nurses Notes reviewed and agree, including vitals, allergies, social history and prior medical history.     REVIEW OF SYSTEMS: All systems reviewed and not pertinent unless noted.    Review of Systems    Past Medical History:   Diagnosis Date   • Bipolar 1 disorder (HCC)    • Bipolar disorder (HCC)    • Bipolar disorder (HCC)    • Depression    • Fractures    • History of blood clots        Allergies:    Patient has no known allergies.      Past Surgical History:   Procedure Laterality Date   • CARDIAC SURGERY           Social History     Socioeconomic History   • Marital status: Single   Tobacco Use   • Smoking status: Every Day     Packs/day: 0.50     Years: 20.00     Pack years: 10.00     Types: Cigarettes   • Smokeless tobacco: Current   Vaping Use   • Vaping Use: Never used   Substance and Sexual Activity   • Alcohol use: Yes     Comment: social   • Drug use: Yes     Types: Marijuana, Amphetamines, Methamphetamines     Comment: Not currently using Meth   • Sexual activity: Defer         Family History   Problem Relation Age of Onset   • Mental illness Mother    • Hypertension Mother    • Heart attack Mother        Objective  Physical  "Exam:  BP (!) 180/120   Pulse 112   Temp 97.3 °F (36.3 °C)   Resp 18   Ht 167.6 cm (66\")   Wt 86.2 kg (190 lb)   SpO2 96%   BMI 30.67 kg/m²      Physical Exam  Vitals and nursing note reviewed. Exam conducted with a chaperone present.   Constitutional:       Appearance: He is well-developed.   HENT:      Head: Normocephalic and atraumatic.      Mouth/Throat:      Mouth: Mucous membranes are moist.   Eyes:      Extraocular Movements: Extraocular movements intact.   Cardiovascular:      Rate and Rhythm: Normal rate and regular rhythm.   Pulmonary:      Effort: Pulmonary effort is normal.      Breath sounds: Normal breath sounds.   Abdominal:      Palpations: Abdomen is soft.   Musculoskeletal:        Arms:       Cervical back: Normal range of motion.      Comments: 4 centimeter left wrist laceration   Skin:     General: Skin is warm and dry.   Neurological:      Mental Status: He is alert and oriented to person, place, and time.   Psychiatric:      Comments: Withdrawn           Laceration Repair    Date/Time: 10/21/2022 3:32 PM  Performed by: David Trimble Jr., PA-C  Authorized by: Kaleb Chaudhary DO     Consent:     Consent obtained:  Verbal    Consent given by:  Patient    Risks discussed:  Pain, poor cosmetic result and poor wound healing  Universal protocol:     Patient identity confirmed:  Verbally with patient  Anesthesia:     Anesthesia method:  Local infiltration    Local anesthetic:  Lidocaine 1% WITH epi  Laceration details:     Location:  Shoulder/arm    Shoulder/arm location:  L lower arm    Length (cm):  3    Depth (mm):  2  Pre-procedure details:     Preparation:  Patient was prepped and draped in usual sterile fashion  Exploration:     Limited defect created (wound extended): no      Hemostasis achieved with:  Direct pressure    Wound exploration: wound explored through full range of motion    Treatment:     Area cleansed with:  Saline and chlorhexidine    Amount of cleaning:  Standard    " Irrigation solution:  Sterile water    Irrigation volume:  Soaked/cleaned    Irrigation method:  Syringe    Visualized foreign bodies/material removed: no      Debridement:  None    Undermining:  None  Skin repair:     Repair method:  Sutures    Suture size:  5-0    Suture material:  Nylon    Suture technique:  Simple interrupted    Number of sutures:  8  Approximation:     Approximation:  Close  Repair type:     Repair type:  Simple  Post-procedure details:     Dressing:  Sterile dressing    Procedure completion:  Tolerated  Laceration Repair    Date/Time: 10/21/2022 3:33 PM  Performed by: David Trimble Jr., PA-C  Authorized by: Kaleb Chaudhary DO     Consent:     Consent obtained:  Verbal    Consent given by:  Patient    Risks discussed:  Pain, poor cosmetic result and poor wound healing  Universal protocol:     Patient identity confirmed:  Verbally with patient  Anesthesia:     Anesthesia method:  Local infiltration    Local anesthetic:  Lidocaine 1% WITH epi  Laceration details:     Location:  Shoulder/arm    Shoulder/arm location:  L lower arm    Length (cm):  1    Depth (mm):  1  Pre-procedure details:     Preparation:  Patient was prepped and draped in usual sterile fashion  Exploration:     Limited defect created (wound extended): no      Hemostasis achieved with:  Direct pressure  Treatment:     Area cleansed with:  Chlorhexidine and saline    Amount of cleaning:  Standard    Irrigation solution:  Sterile saline    Irrigation method:  Syringe    Debridement:  None    Undermining:  None  Skin repair:     Repair method:  Sutures    Suture size:  3-0    Suture material:  Nylon    Suture technique:  Simple interrupted    Number of sutures:  3  Approximation:     Approximation:  Close  Post-procedure details:     Dressing:  Open (no dressing)    Procedure completion:  Tolerated well, no immediate complications        ED Course:    ED Course as of 11/01/22 1315   Fri Oct 21, 2022   1449 Discussed the case  with behavioral health [CS]      ED Course User Index  [CS] David Trimble Jr., PA-C       Lab Results (last 24 hours)     ** No results found for the last 24 hours. **           No radiology results from the last 24 hrs       MDM  Number of Diagnoses or Management Options  Laceration of left upper extremity, initial encounter: new and requires workup  Medical clearance for psychiatric admission: new and requires workup  Suicidal behavior with attempted self-injury (HCC): new and requires workup     Amount and/or Complexity of Data Reviewed  Clinical lab tests: ordered and reviewed  Obtain history from someone other than the patient: yes  Review and summarize past medical records: yes  Discuss the patient with other providers: yes    Risk of Complications, Morbidity, and/or Mortality  Presenting problems: moderate  Management options: moderate  General comments: 42-year-old male presents with a laceration to his left wrist, 2 lacerations.  Brought in for suicidal thoughts and self injury.  Evaluated the patient at bedside, labs were drawn per protocol for SI.  Lacerations were cleaned prepped and sutured, and bandage applied.  Reviewed labs and discussed with the patient at the bedside, discussed patient with behavioral health, and requested a bedside evaluation.  Reviewed and summarized previous records, also obtained history from [place] [employer].  Patient is medically clear for police to take to psychiatric [facility]    Patient Progress  Patient progress: stable        Final diagnoses:   Suicidal behavior with attempted self-injury (HCC)   Medical clearance for psychiatric admission   Laceration of left upper extremity, initial encounter        David Trimble Jr., PA-C  10/21/22 5142       David Trimble Jr., PA-C  11/01/22 1227

## 2024-01-20 ENCOUNTER — HOSPITAL ENCOUNTER (EMERGENCY)
Facility: HOSPITAL | Age: 44
Discharge: HOME | End: 2024-01-20
Attending: EMERGENCY MEDICINE
Payer: MEDICAID

## 2024-01-20 VITALS
WEIGHT: 202 LBS | BODY MASS INDEX: 32.47 KG/M2 | OXYGEN SATURATION: 94 % | RESPIRATION RATE: 16 BRPM | SYSTOLIC BLOOD PRESSURE: 174 MMHG | HEIGHT: 66 IN | TEMPERATURE: 97.9 F | HEART RATE: 103 BPM | DIASTOLIC BLOOD PRESSURE: 106 MMHG

## 2024-01-20 DIAGNOSIS — I10 PRIMARY HYPERTENSION: ICD-10-CM

## 2024-01-20 DIAGNOSIS — F41.9 ANXIETY: ICD-10-CM

## 2024-01-20 DIAGNOSIS — I10 ELEVATED BLOOD PRESSURE READING WITH DIAGNOSIS OF HYPERTENSION: Primary | ICD-10-CM

## 2024-01-20 PROCEDURE — 99283 EMERGENCY DEPT VISIT LOW MDM: CPT

## 2024-01-20 RX ORDER — HYDROXYZINE PAMOATE 50 MG/1
50 CAPSULE ORAL ONCE
Status: COMPLETED | OUTPATIENT
Start: 2024-01-20 | End: 2024-01-20

## 2024-01-20 RX ORDER — HYDROXYZINE PAMOATE 50 MG/1
50 CAPSULE ORAL 3 TIMES DAILY PRN
Qty: 30 CAPSULE | Refills: 0 | Status: SHIPPED | OUTPATIENT
Start: 2024-01-20

## 2024-01-20 RX ORDER — LISINOPRIL 20 MG/1
20 TABLET ORAL ONCE
Status: COMPLETED | OUTPATIENT
Start: 2024-01-20 | End: 2024-01-20

## 2024-01-20 RX ORDER — LISINOPRIL 20 MG/1
20 TABLET ORAL DAILY
Qty: 30 TABLET | Refills: 0 | Status: SHIPPED | OUTPATIENT
Start: 2024-01-20

## 2024-01-20 RX ADMIN — LISINOPRIL 20 MG: 20 TABLET ORAL at 10:16

## 2024-01-20 RX ADMIN — HYDROXYZINE PAMOATE 50 MG: 50 CAPSULE ORAL at 10:16

## 2024-01-20 NOTE — ED PROVIDER NOTES
"Subjective  History of Present Illness:    Chief Complaint: Anxiety,    History of Present Illness: 43-year-old male here with anxiety per triage note has had issues with hallucinations, patient denies any hallucinations states that he has a history of hypertension as well as not been on medications in the past 5 to 6 months.    Nurses Notes reviewed and agree, including vitals, allergies, social history and prior medical history.     REVIEW OF SYSTEMS: All systems reviewed and not pertinent unless noted.  Review of Systems      Positive for: Anxiety    Negative for: Hallucinations suicidal or homicidal ideation    Past Medical History:   Diagnosis Date    Bipolar 1 disorder     Bipolar disorder     Bipolar disorder     Depression     Fractures     History of blood clots        Allergies:    Patient has no known allergies.      Past Surgical History:   Procedure Laterality Date    CARDIAC SURGERY           Social History     Socioeconomic History    Marital status: Single   Tobacco Use    Smoking status: Every Day     Packs/day: 0.50     Years: 20.00     Additional pack years: 0.00     Total pack years: 10.00     Types: Cigarettes    Smokeless tobacco: Current   Vaping Use    Vaping Use: Never used   Substance and Sexual Activity    Alcohol use: Yes     Comment: sometimes    Drug use: Yes     Types: Marijuana     Comment: Not currently using Meth    Sexual activity: Defer         Family History   Problem Relation Age of Onset    Mental illness Mother     Hypertension Mother     Heart attack Mother        Objective  Physical Exam:  BP (!) 174/106   Pulse 103   Temp 97.9 °F (36.6 °C) (Oral)   Resp 16   Ht 167.6 cm (66\")   Wt 91.6 kg (202 lb)   SpO2 94%   BMI 32.60 kg/m²      Physical Exam    CONSTITUTIONAL: Well developed, nontoxic 43-year-old male,  in no acute distress.  VITAL SIGNS: per nursing, reviewed and noted  SKIN: exposed skin with no rashes, ulcerations or petechiae  EYES: Grossly EOMI, no " icterus  ENT: Normal voice.  Moist mucous membranes   RESPIRATORY:  No increased work of breathing. No retractions.   CARDIOVASCULAR:   Extremities pink and warm.  Good cap refill to extremities.   GI: Abdomen without distention   MUSCULOSKELETAL: Age-appropriate bulk and tone, moves all 4 extremities  NEUROLOGIC: Alert, oriented x 3. No gross deficits. GCS 15.   PSYCH: Flat affect.  : no bladder tenderness or distention, no CVA tenderness    Procedures    ED Course:    Lab Results (last 24 hours)       ** No results found for the last 24 hours. **             No radiology results from the last 24 hrs     MDM         Medical Decision Making:    Initial impression of presenting illness: 43-year-old male here with anxiety per triage note has had issues with hallucinations, patient denies any hallucinations states that he has a history of hypertension as well as not been on medications in the past 5 to 6 months.      DDX: includes but is not limited to: Anxiety         Patient arrives hypertensive afebrile sats 94% with vitals interpreted by myself.     Pertinent features from physical exam: Benign exam flat affect,     Initial diagnostic plan: Defer diagnostics    Results from initial plan were reviewed and interpreted by me revealing deferred    Diagnostic information from other sources: Old record review    Interventions / Re-evaluation: Hydroxyzine and lisinopril    Results/clinical rationale were discussed with patient    Consultations/Discussion of results with other physicians: None    Disposition plan: Patient was discharged in stable condition.  Counseled on supportive care, outpatient follow-up. Return precaution discussed.  Patient/family was understanding and agreeable with plan  Prescription hydroxyzine, refilled lisinopril.  Outpatient follow-up.  -----      Final diagnoses:   Elevated blood pressure reading with diagnosis of hypertension   Paulo Canales,   01/20/24 1119

## 2024-03-01 ENCOUNTER — HOSPITAL ENCOUNTER (EMERGENCY)
Facility: HOSPITAL | Age: 44
Discharge: OTHER INSTITUTION | DRG: 885 | End: 2024-03-01
Attending: STUDENT IN AN ORGANIZED HEALTH CARE EDUCATION/TRAINING PROGRAM
Payer: MEDICAID

## 2024-03-01 ENCOUNTER — HOSPITAL ENCOUNTER (INPATIENT)
Facility: HOSPITAL | Age: 44
LOS: 3 days | Discharge: HOME | DRG: 885 | End: 2024-03-04
Attending: STUDENT IN AN ORGANIZED HEALTH CARE EDUCATION/TRAINING PROGRAM | Admitting: STUDENT IN AN ORGANIZED HEALTH CARE EDUCATION/TRAINING PROGRAM
Payer: MEDICAID

## 2024-03-01 ENCOUNTER — APPOINTMENT (OUTPATIENT)
Dept: GENERAL RADIOLOGY | Facility: HOSPITAL | Age: 44
DRG: 885 | End: 2024-03-01
Payer: MEDICAID

## 2024-03-01 VITALS
SYSTOLIC BLOOD PRESSURE: 155 MMHG | WEIGHT: 208.4 LBS | DIASTOLIC BLOOD PRESSURE: 100 MMHG | RESPIRATION RATE: 18 BRPM | HEIGHT: 66 IN | OXYGEN SATURATION: 98 % | HEART RATE: 81 BPM | TEMPERATURE: 98.1 F | BODY MASS INDEX: 33.49 KG/M2

## 2024-03-01 DIAGNOSIS — F06.1: Primary | ICD-10-CM

## 2024-03-01 DIAGNOSIS — F06.1 CATATONIA: Primary | ICD-10-CM

## 2024-03-01 DIAGNOSIS — F99 PSYCHIATRIC DISORDER: ICD-10-CM

## 2024-03-01 DIAGNOSIS — F31.30: Primary | ICD-10-CM

## 2024-03-01 PROBLEM — F32.A DEPRESSION: Status: ACTIVE | Noted: 2024-03-01

## 2024-03-01 LAB
ALBUMIN SERPL-MCNC: 4.6 G/DL (ref 3.5–5.2)
ALBUMIN/GLOB SERPL: 1.8 G/DL
ALP SERPL-CCNC: 75 U/L (ref 39–117)
ALT SERPL W P-5'-P-CCNC: 10 U/L (ref 1–41)
AMPHET+METHAMPHET UR QL: NEGATIVE
AMPHETAMINES UR QL: NEGATIVE
ANION GAP SERPL CALCULATED.3IONS-SCNC: 11 MMOL/L (ref 5–15)
APAP SERPL-MCNC: <5 MCG/ML (ref 0–30)
AST SERPL-CCNC: 13 U/L (ref 1–40)
B PARAPERT DNA SPEC QL NAA+PROBE: NOT DETECTED
B PERT DNA SPEC QL NAA+PROBE: NOT DETECTED
BARBITURATES UR QL SCN: NEGATIVE
BASOPHILS # BLD AUTO: 0.05 10*3/MM3 (ref 0–0.2)
BASOPHILS NFR BLD AUTO: 0.7 % (ref 0–1.5)
BENZODIAZ UR QL SCN: POSITIVE
BILIRUB SERPL-MCNC: 0.2 MG/DL (ref 0–1.2)
BILIRUB UR QL STRIP: NEGATIVE
BUN SERPL-MCNC: 13 MG/DL (ref 6–20)
BUN/CREAT SERPL: 14.6 (ref 7–25)
BUPRENORPHINE SERPL-MCNC: NEGATIVE NG/ML
C PNEUM DNA NPH QL NAA+NON-PROBE: NOT DETECTED
CALCIUM SPEC-SCNC: 9.3 MG/DL (ref 8.6–10.5)
CANNABINOIDS SERPL QL: POSITIVE
CHLORIDE SERPL-SCNC: 105 MMOL/L (ref 98–107)
CLARITY UR: CLEAR
CO2 SERPL-SCNC: 24 MMOL/L (ref 22–29)
COCAINE UR QL: NEGATIVE
COLOR UR: YELLOW
CREAT SERPL-MCNC: 0.89 MG/DL (ref 0.76–1.27)
DEPRECATED RDW RBC AUTO: 45.6 FL (ref 37–54)
EGFRCR SERPLBLD CKD-EPI 2021: 110.4 ML/MIN/1.73
EOSINOPHIL # BLD AUTO: 0.24 10*3/MM3 (ref 0–0.4)
EOSINOPHIL NFR BLD AUTO: 3.2 % (ref 0.3–6.2)
ERYTHROCYTE [DISTWIDTH] IN BLOOD BY AUTOMATED COUNT: 14.3 % (ref 12.3–15.4)
ETHANOL BLD-MCNC: <10 MG/DL (ref 0–10)
ETHANOL UR QL: <0.01 %
FENTANYL UR-MCNC: NEGATIVE NG/ML
FLUAV SUBTYP SPEC NAA+PROBE: NOT DETECTED
FLUBV RNA ISLT QL NAA+PROBE: NOT DETECTED
GLOBULIN UR ELPH-MCNC: 2.6 GM/DL
GLUCOSE SERPL-MCNC: 89 MG/DL (ref 65–99)
GLUCOSE UR STRIP-MCNC: NEGATIVE MG/DL
HADV DNA SPEC NAA+PROBE: NOT DETECTED
HCOV 229E RNA SPEC QL NAA+PROBE: NOT DETECTED
HCOV HKU1 RNA SPEC QL NAA+PROBE: NOT DETECTED
HCOV NL63 RNA SPEC QL NAA+PROBE: NOT DETECTED
HCOV OC43 RNA SPEC QL NAA+PROBE: NOT DETECTED
HCT VFR BLD AUTO: 41.3 % (ref 37.5–51)
HGB BLD-MCNC: 13.9 G/DL (ref 13–17.7)
HGB UR QL STRIP.AUTO: NEGATIVE
HMPV RNA NPH QL NAA+NON-PROBE: NOT DETECTED
HOLD SPECIMEN: NORMAL
HOLD SPECIMEN: NORMAL
HPIV1 RNA ISLT QL NAA+PROBE: NOT DETECTED
HPIV2 RNA SPEC QL NAA+PROBE: NOT DETECTED
HPIV3 RNA NPH QL NAA+PROBE: NOT DETECTED
HPIV4 P GENE NPH QL NAA+PROBE: NOT DETECTED
IMM GRANULOCYTES # BLD AUTO: 0.03 10*3/MM3 (ref 0–0.05)
IMM GRANULOCYTES NFR BLD AUTO: 0.4 % (ref 0–0.5)
KETONES UR QL STRIP: NEGATIVE
LEUKOCYTE ESTERASE UR QL STRIP.AUTO: NEGATIVE
LYMPHOCYTES # BLD AUTO: 2.01 10*3/MM3 (ref 0.7–3.1)
LYMPHOCYTES NFR BLD AUTO: 26.6 % (ref 19.6–45.3)
M PNEUMO IGG SER IA-ACNC: NOT DETECTED
MAGNESIUM SERPL-MCNC: 1.9 MG/DL (ref 1.6–2.6)
MCH RBC QN AUTO: 29.1 PG (ref 26.6–33)
MCHC RBC AUTO-ENTMCNC: 33.7 G/DL (ref 31.5–35.7)
MCV RBC AUTO: 86.6 FL (ref 79–97)
METHADONE UR QL SCN: NEGATIVE
MONOCYTES # BLD AUTO: 0.52 10*3/MM3 (ref 0.1–0.9)
MONOCYTES NFR BLD AUTO: 6.9 % (ref 5–12)
NEUTROPHILS NFR BLD AUTO: 4.7 10*3/MM3 (ref 1.7–7)
NEUTROPHILS NFR BLD AUTO: 62.2 % (ref 42.7–76)
NITRITE UR QL STRIP: NEGATIVE
NRBC BLD AUTO-RTO: 0 /100 WBC (ref 0–0.2)
OPIATES UR QL: NEGATIVE
OXYCODONE UR QL SCN: NEGATIVE
PCP UR QL SCN: NEGATIVE
PH UR STRIP.AUTO: 7 [PH] (ref 5–8)
PLATELET # BLD AUTO: 279 10*3/MM3 (ref 140–450)
PMV BLD AUTO: 9.5 FL (ref 6–12)
POTASSIUM SERPL-SCNC: 4.2 MMOL/L (ref 3.5–5.2)
PROT SERPL-MCNC: 7.2 G/DL (ref 6–8.5)
PROT UR QL STRIP: NEGATIVE
RBC # BLD AUTO: 4.77 10*6/MM3 (ref 4.14–5.8)
RHINOVIRUS RNA SPEC NAA+PROBE: DETECTED
RSV RNA NPH QL NAA+NON-PROBE: NOT DETECTED
SALICYLATES SERPL-MCNC: <0.3 MG/DL
SARS-COV-2 RNA NPH QL NAA+NON-PROBE: NOT DETECTED
SODIUM SERPL-SCNC: 140 MMOL/L (ref 136–145)
SP GR UR STRIP: <=1.005 (ref 1–1.03)
TRICYCLICS UR QL SCN: NEGATIVE
TROPONIN T SERPL HS-MCNC: 8 NG/L
UROBILINOGEN UR QL STRIP: NORMAL
VALPROATE SERPL-MCNC: 68.5 MCG/ML (ref 50–125)
WBC NRBC COR # BLD AUTO: 7.55 10*3/MM3 (ref 3.4–10.8)
WHOLE BLOOD HOLD COAG: NORMAL
WHOLE BLOOD HOLD SPECIMEN: NORMAL

## 2024-03-01 PROCEDURE — 81003 URINALYSIS AUTO W/O SCOPE: CPT

## 2024-03-01 PROCEDURE — 80053 COMPREHEN METABOLIC PANEL: CPT

## 2024-03-01 PROCEDURE — 99285 EMERGENCY DEPT VISIT HI MDM: CPT

## 2024-03-01 PROCEDURE — 0202U NFCT DS 22 TRGT SARS-COV-2: CPT | Performed by: STUDENT IN AN ORGANIZED HEALTH CARE EDUCATION/TRAINING PROGRAM

## 2024-03-01 PROCEDURE — 80179 DRUG ASSAY SALICYLATE: CPT | Performed by: PHYSICIAN ASSISTANT

## 2024-03-01 PROCEDURE — 93005 ELECTROCARDIOGRAM TRACING: CPT

## 2024-03-01 PROCEDURE — 80143 DRUG ASSAY ACETAMINOPHEN: CPT | Performed by: PHYSICIAN ASSISTANT

## 2024-03-01 PROCEDURE — 82077 ASSAY SPEC XCP UR&BREATH IA: CPT | Performed by: PHYSICIAN ASSISTANT

## 2024-03-01 PROCEDURE — 85025 COMPLETE CBC W/AUTO DIFF WBC: CPT

## 2024-03-01 PROCEDURE — 84484 ASSAY OF TROPONIN QUANT: CPT

## 2024-03-01 PROCEDURE — 71045 X-RAY EXAM CHEST 1 VIEW: CPT

## 2024-03-01 PROCEDURE — 80307 DRUG TEST PRSMV CHEM ANLYZR: CPT | Performed by: PHYSICIAN ASSISTANT

## 2024-03-01 PROCEDURE — 83735 ASSAY OF MAGNESIUM: CPT

## 2024-03-01 PROCEDURE — 80164 ASSAY DIPROPYLACETIC ACD TOT: CPT | Performed by: PHYSICIAN ASSISTANT

## 2024-03-01 RX ORDER — DIVALPROEX SODIUM 500 MG/1
500 TABLET, EXTENDED RELEASE ORAL DAILY
COMMUNITY
End: 2024-03-04 | Stop reason: HOSPADM

## 2024-03-01 RX ORDER — OLANZAPINE 5 MG/1
10 TABLET ORAL NIGHTLY
Status: COMPLETED | OUTPATIENT
Start: 2024-03-01 | End: 2024-03-02

## 2024-03-01 RX ORDER — ALUMINA, MAGNESIA, AND SIMETHICONE 2400; 2400; 240 MG/30ML; MG/30ML; MG/30ML
15 SUSPENSION ORAL EVERY 6 HOURS PRN
Status: DISCONTINUED | OUTPATIENT
Start: 2024-03-01 | End: 2024-03-04 | Stop reason: HOSPADM

## 2024-03-01 RX ORDER — BENZTROPINE MESYLATE 1 MG/ML
1 INJECTION INTRAMUSCULAR; INTRAVENOUS EVERY 8 HOURS PRN
Status: CANCELLED | OUTPATIENT
Start: 2024-03-01

## 2024-03-01 RX ORDER — BENZTROPINE MESYLATE 1 MG/ML
0.5 INJECTION INTRAMUSCULAR; INTRAVENOUS DAILY PRN
Status: DISCONTINUED | OUTPATIENT
Start: 2024-03-01 | End: 2024-03-04 | Stop reason: HOSPADM

## 2024-03-01 RX ORDER — LISINOPRIL 10 MG/1
20 TABLET ORAL DAILY
Status: DISCONTINUED | OUTPATIENT
Start: 2024-03-02 | End: 2024-03-04 | Stop reason: HOSPADM

## 2024-03-01 RX ORDER — TRAZODONE HYDROCHLORIDE 50 MG/1
50 TABLET ORAL NIGHTLY PRN
Status: CANCELLED | OUTPATIENT
Start: 2024-03-01

## 2024-03-01 RX ORDER — ACETAMINOPHEN 325 MG/1
650 TABLET ORAL EVERY 4 HOURS PRN
Status: DISCONTINUED | OUTPATIENT
Start: 2024-03-01 | End: 2024-03-04 | Stop reason: HOSPADM

## 2024-03-01 RX ORDER — HYDROXYZINE HYDROCHLORIDE 25 MG/1
25 TABLET, FILM COATED ORAL EVERY 4 HOURS PRN
Status: DISCONTINUED | OUTPATIENT
Start: 2024-03-01 | End: 2024-03-04 | Stop reason: HOSPADM

## 2024-03-01 RX ORDER — NICOTINE 21 MG/24HR
1 PATCH, TRANSDERMAL 24 HOURS TRANSDERMAL EVERY 24 HOURS
Status: DISCONTINUED | OUTPATIENT
Start: 2024-03-01 | End: 2024-03-02

## 2024-03-01 RX ORDER — ACETAMINOPHEN 325 MG/1
650 TABLET ORAL EVERY 4 HOURS PRN
Status: CANCELLED | OUTPATIENT
Start: 2024-03-01

## 2024-03-01 RX ORDER — SODIUM CHLORIDE 0.9 % (FLUSH) 0.9 %
10 SYRINGE (ML) INJECTION AS NEEDED
Status: DISCONTINUED | OUTPATIENT
Start: 2024-03-01 | End: 2024-03-01 | Stop reason: HOSPADM

## 2024-03-01 RX ORDER — OLANZAPINE 5 MG/1
5 TABLET ORAL NIGHTLY
Status: DISCONTINUED | OUTPATIENT
Start: 2024-03-03 | End: 2024-03-04 | Stop reason: HOSPADM

## 2024-03-01 RX ORDER — BENZTROPINE MESYLATE 1 MG/1
2 TABLET ORAL EVERY 8 HOURS PRN
Status: CANCELLED | OUTPATIENT
Start: 2024-03-01

## 2024-03-01 RX ORDER — LORAZEPAM 2 MG/1
2 TABLET ORAL 3 TIMES DAILY
Status: DISCONTINUED | OUTPATIENT
Start: 2024-03-01 | End: 2024-03-04 | Stop reason: HOSPADM

## 2024-03-01 RX ORDER — LOPERAMIDE HYDROCHLORIDE 2 MG/1
2 CAPSULE ORAL
Status: DISCONTINUED | OUTPATIENT
Start: 2024-03-01 | End: 2024-03-04 | Stop reason: HOSPADM

## 2024-03-01 RX ORDER — HYDROXYZINE HYDROCHLORIDE 25 MG/1
25 TABLET, FILM COATED ORAL EVERY 4 HOURS PRN
Status: CANCELLED | OUTPATIENT
Start: 2024-03-01

## 2024-03-01 RX ORDER — LORAZEPAM 2 MG/1
2 TABLET ORAL EVERY 6 HOURS PRN
COMMUNITY
End: 2024-03-04 | Stop reason: HOSPADM

## 2024-03-01 RX ORDER — BENZTROPINE MESYLATE 1 MG/1
1 TABLET ORAL DAILY PRN
Status: DISCONTINUED | OUTPATIENT
Start: 2024-03-01 | End: 2024-03-04 | Stop reason: HOSPADM

## 2024-03-01 RX ORDER — ALUMINA, MAGNESIA, AND SIMETHICONE 2400; 2400; 240 MG/30ML; MG/30ML; MG/30ML
15 SUSPENSION ORAL EVERY 6 HOURS PRN
Status: CANCELLED | OUTPATIENT
Start: 2024-03-01

## 2024-03-01 RX ORDER — PALIPERIDONE 3 MG/1
3 TABLET, EXTENDED RELEASE ORAL EVERY MORNING
COMMUNITY
End: 2024-03-04 | Stop reason: HOSPADM

## 2024-03-01 RX ORDER — TRAZODONE HYDROCHLORIDE 50 MG/1
50 TABLET ORAL NIGHTLY PRN
Status: DISCONTINUED | OUTPATIENT
Start: 2024-03-01 | End: 2024-03-04 | Stop reason: HOSPADM

## 2024-03-01 RX ORDER — DIVALPROEX SODIUM 500 MG/1
500 TABLET, EXTENDED RELEASE ORAL 2 TIMES DAILY
Status: DISCONTINUED | OUTPATIENT
Start: 2024-03-01 | End: 2024-03-04 | Stop reason: HOSPADM

## 2024-03-01 RX ORDER — LOPERAMIDE HYDROCHLORIDE 2 MG/1
2 CAPSULE ORAL
Status: CANCELLED | OUTPATIENT
Start: 2024-03-01

## 2024-03-01 RX ORDER — GUAIFENESIN 200 MG/10ML
200 LIQUID ORAL EVERY 4 HOURS PRN
Status: DISCONTINUED | OUTPATIENT
Start: 2024-03-01 | End: 2024-03-04 | Stop reason: HOSPADM

## 2024-03-01 RX ADMIN — LORAZEPAM 2 MG: 2 TABLET ORAL at 20:24

## 2024-03-01 RX ADMIN — GUAIFENESIN 200 MG: 100 SOLUTION ORAL at 21:23

## 2024-03-01 RX ADMIN — LORAZEPAM 2 MG: 2 TABLET ORAL at 14:33

## 2024-03-01 RX ADMIN — OLANZAPINE 10 MG: 5 TABLET, FILM COATED ORAL at 20:25

## 2024-03-01 RX ADMIN — ACETAMINOPHEN 650 MG: 325 TABLET, FILM COATED ORAL at 20:25

## 2024-03-01 RX ADMIN — Medication 1 PATCH: at 18:41

## 2024-03-01 RX ADMIN — DIVALPROEX SODIUM 500 MG: 500 TABLET, EXTENDED RELEASE ORAL at 20:24

## 2024-03-01 NOTE — CONSULTS
Sekou Hackett  1980      Race/Ethnicity: White or   Martial Status: Single  Guardian Name/Contact/etc: Self  Pt Lives With:  Sister Irene Trejo  Occupation: Unknown to clinician  Appearance: clean and casually dressed, appropriate     Time Called for Assessment: 10:56  Assessment Start and End: 11:07 - 11:37      DATA:   Clinician received a call from [facility] staff for a behavioral health consult.  The patient is agreeable to speak with the behavioral health team.  Met with patient at bedside. Patient is not under 1:1 security monitoring.  The attending treatment team is MARY Rodriguez and Jaci Veliz.  Patient presents today with chief compliant of  Nervous, Anxious, Catatonia . Patient's sister Irene reports that patient has been experiencing confusing especially at night. Irene reports that at night he will stand and stare at her which she will ask him questions and then he will ask where his bed or his clothes are. Irene reports patient is normally engaging and talks with others but lately has been isolating. Irene reports patient having similar behaviors before and it led him to being in [place] because he slit his wrist. Irene reports she is concerned about leaving him home alone because he has wandered in the past. Patient reports having his Invega injection yesterday 2/29. Clinician completed assessment with patient and observations are documented as follows.    ASSESSMENT:    Clinician consulted with patient for mental status exam and assessment.  Clinical descriptors are documented as follows.  Clinician completed CSSRS with patient for suicide risk assessment.  The results of patient’s CSSRS documented as follows.    Presenting Problems: Nervous, Anxious, and Catatonia.  Current Stressors: Unknown cause     Established Therapy, Medication Management or Other Mental Health Services: [facility]    Current Psychiatric Medications:   divalproex (DEPAKOTE ER) 500 MG 24  "hr tablet   LORazepam (ATIVAN) 2 MG tablet  Invega Injection      Mental Status Exam:  Behavior: Withdrawn  Psychomotor Movement: Appropriate  Attention and Cooperation: Normal and Cooperative  Mood: appropriate to circumstances and Affect: Blunted  Orientation: oriented to person, oriented to place, and not oriented to time   Thought Process: linear, logical, and goal directed  Thought Content: normal  Delusions: None   Hallucinations: None   Concentration: Normal  Suicidal Ideation: Absent  Homicidal Ideation: Absent  Hopelessness: N/A  Speech: Minimal  Eye Contact: Fair  Insight: Poor  Judgement: Poor    Depression: 6   Anxiety:  6-7  Sleep:  \"On/off for the last 2 nights.\"    Appetite:  \"Not good, hasn't been eating much.\"        Hx of Psychiatric or Detox Hospitalizations:  Yes, describe: [facility]  Most recent inpatient admission: One month ago for 4 days.    Suicidal Ideation Assessment:    [place]-SUICIDE SEVERITY RATING SCALE  Psychiatric Inpatient Setting - Discharge Screener    Ask questions that are bold and underlined Discharge   Ask Questions 1 and 2 YES NO   Wish to be Dead:   Person endorses thoughts about a wish to be dead or not alive anymore, or wish to fall asleep and not wake up.  While you were here in the hospital, have you wished you were dead or wished you could go to sleep and not wake up?  X   Suicidal Thoughts:   General non-specific thoughts of wanting to end one's life/die by suicide, “I've thought about killing myself” without general thoughts of ways to kill oneself/associated methods, intent, or plan.   While you were here in the hospital, have you actually had thoughts about killing yourself?   X   If YES to 2, ask questions 3, 4, 5, and 6.  If NO to 2, go directly to question 6   3) Suicidal Thoughts with Method (without Specific Plan or Intent to Act):   Person endorses thoughts of suicide and has thought of a least one method during the assessment period. This is different than a " specific plan with time, place or method details worked out. “I thought about taking an overdose but I never made a specific plan as to when where or how I would actually do it….and I would never go through with it.”   Have you been thinking about how you might kill yourself?      4) Suicidal Intent (without Specific Plan):   Active suicidal thoughts of killing oneself and patient reports having some intent to act on such thoughts, as opposed to “I have the thoughts but I definitely will not do anything about them.”   Have you had these thoughts and had some intention of acting on them or do you have some intention of acting on them after you leave the hospital?      5) Suicide Intent with Specific Plan:   Thoughts of killing oneself with details of plan fully or partially worked out and person has some intent to carry it out.   Have you started to work out or worked out the details of how to kill yourself either for while you were here in the hospital or for after you leave the hospital? Do you intend to carry out this plan?        6) Suicide Behavior    While you were here in the hospital, have you done anything, started to do anything, or prepared to do anything to end your life?    Examples: Took pills, cut yourself, tried to hang yourself, took out pills but didn't swallow any because you changed your mind or someone took them from you, collected pills, secured a means of obtaining a gun, gave away valuables, wrote a will or suicide note, etc.  X     Suicidal: Absent   Previous Attempts: One prior suicide attempt  Most Recent Attempt: One month ago prior to [facility] admit.    Psychosocial History    Highest Level of Education: high school diploma/GED   Family Hx of Mental Health/Substance Abuse: No  Patient Trauma/Abuse History: N/A    Does this require reporting: N/A  Patient Identified Support System (List family members, loved ones, guardians, friends, etc): Sister, Girlfriend    Legal History / History of  Violence: None known   Experience with Interpersonal Violence: No  History of Inappropriate Sexual Behavior: No  Current Medical Conditions or Biomedical Complications: No (If yes, explain/list)    Social Determinants of Health  Housing Instability and/or Utility Needs: No  Food Insecurity: No  Transportation Needs: No    Substance Use History  Active Use: No     PLAN:  At this time, clinician recommends inpatient treatment based upon the above assessment.   Clinician collaborated with the treatment team who agree to adopt the recommendations.  Clinician discussed recommendations with patient and/or patient support systems, and patient is agreeable to the plan.  Patient is agreeable for referrals to be sent to facilities and agencies for treatment.    Have the levels of care been discussed with the patient? Yes  Level of care recommendation: Inpatient  Is patient agreeable to treatment? Yes    Safety Planning:  Does the patient have access to weapons or firearms? No  Did clinician discuss securing weapons, firearms, sharps and/or medications with the patient? Yes  Safety Plan: Clinician verbally engaged in safety planning by assisting the patient in identifying risk factors that would indicate the need for higher level of care, such as thoughts to harm self or others and/or self-harming behavior(s). Safety plan of report to nearest hospital, calling local police or 911 if feeling unsafe, if having suicidal or homicidal thoughts, or if in emergent need of medications verbally reviewed with patient during assessment and suicide prevention/crisis hotlines verbally reviewed with patient during assessment. Patient during assessment verbally agreed to safety plan. Reviewed resources of crisis hotlines or presenting to the nearest emergency department should symptoms worsen, or in any crisis/emergency. Patient agreeable and voiced understanding.     Care Coordination Timeline:  11:50-12:00:  Clinician batool  presented case to Dr. Swanson. Dr. Swanson agreed patient is appropriate for [facility] admission.   12:03: Clinician notified treatment team of acceptance.    SIGNATURE  Seth Bran MSW, CSW

## 2024-03-01 NOTE — ED PROVIDER NOTES
Subjective:  Chief Complaint:  Anxiety, confusion    History of Present Illness:  Patient is a 43-year-old male with history of bipolar disorder, schizophrenia, anxiety presenting to the ER with complaints of confusion and anxiety.  Patient's family member at bedside reports he has been roaming at night and will stare blankly and appears confused or disoriented.  Patient is currently alert and oriented x 3 and appears to be answering questions appropriately.  He states that he feels disconnected and somewhat realizes he is doing these things but not fully.  He was seen on 2- at [facility] and was given Ativan with improvement of symptoms.  However, at that time it appears he was out of his Ativan and family member at bedside reports he has now been taking it and denies any missed doses.  He had an appointment at [facility] on 2- and was started on Invega but supposedly symptoms have not improved.  Patient denies any physical complaints at this time.  Denies auditory and visual hallucinations and denies SI and HI.      Nurses Notes reviewed and agree, including vitals, allergies, social history and prior medical history.     REVIEW OF SYSTEMS: All systems reviewed and not pertinent unless noted.  Review of Systems   Psychiatric/Behavioral:  Positive for confusion. The patient is nervous/anxious.    All other systems reviewed and are negative.      Past Medical History:   Diagnosis Date    Bipolar 1 disorder     Bipolar disorder     Bipolar disorder     Depression     Fractures     History of blood clots        Allergies:    Patient has no known allergies.      Past Surgical History:   Procedure Laterality Date    CARDIAC SURGERY           Social History     Socioeconomic History    Marital status: Single   Tobacco Use    Smoking status: Every Day     Packs/day: 0.50     Years: 20.00     Additional pack years: 0.00     Total pack years: 10.00     Types: Cigarettes    Smokeless tobacco: Current  "  Vaping Use    Vaping Use: Never used   Substance and Sexual Activity    Alcohol use: Yes     Comment: sometimes    Drug use: Yes     Types: Marijuana     Comment: Not currently using Meth    Sexual activity: Defer         Family History   Problem Relation Age of Onset    Mental illness Mother     Hypertension Mother     Heart attack Mother        Objective  Physical Exam:  /92   Pulse 103   Temp 98.1 °F (36.7 °C) (Oral)   Resp 18   Ht 167.6 cm (66\")   Wt 94.5 kg (208 lb 6.4 oz)   SpO2 98%   BMI 33.64 kg/m²      Physical Exam  Vitals and nursing note reviewed.   Constitutional:       General: He is not in acute distress.     Appearance: He is not toxic-appearing.   HENT:      Head: Normocephalic and atraumatic.   Eyes:      Extraocular Movements: Extraocular movements intact.   Cardiovascular:      Rate and Rhythm: Tachycardia present.   Pulmonary:      Effort: Pulmonary effort is normal. No respiratory distress.   Abdominal:      General: There is no distension.   Musculoskeletal:         General: Normal range of motion.      Cervical back: Normal range of motion and neck supple.   Skin:     General: Skin is warm and dry.   Neurological:      Mental Status: He is alert and oriented to person, place, and time.      GCS: GCS eye subscore is 4. GCS verbal subscore is 5. GCS motor subscore is 6.   Psychiatric:         Mood and Affect: Mood normal.         Behavior: Behavior normal.         Procedures    ED Course:         Lab Results (last 24 hours)       Procedure Component Value Units Date/Time    CBC & Differential [[id number]]  (Normal) Collected: 03/01/24 1041    Specimen: Blood Updated: 03/01/24 1051    Narrative:      The following orders were created for panel order CBC & Differential.  Procedure                               Abnormality         Status                     ---------                               -----------         ------                     CBC Auto Differential[[id number]]        " Normal              Final result                 Please view results for these tests on the individual orders.    Comprehensive Metabolic Panel [[id number]] Collected: 03/01/24 1041    Specimen: Blood Updated: 03/01/24 1113     Glucose 89 mg/dL      BUN 13 mg/dL      Creatinine 0.89 mg/dL      Sodium 140 mmol/L      Potassium 4.2 mmol/L      Chloride 105 mmol/L      CO2 24.0 mmol/L      Calcium 9.3 mg/dL      Total Protein 7.2 g/dL      Albumin 4.6 g/dL      ALT (SGPT) 10 U/L      AST (SGOT) 13 U/L      Alkaline Phosphatase 75 U/L      Total Bilirubin 0.2 mg/dL      Globulin 2.6 gm/dL      A/G Ratio 1.8 g/dL      BUN/Creatinine Ratio 14.6     Anion Gap 11.0 mmol/L      eGFR 110.4 mL/min/1.73     Narrative:      GFR Normal >60  Chronic Kidney Disease <60  Kidney Failure <15      Single High Sensitivity Troponin T [[id number]]  (Normal) Collected: 03/01/24 1041    Specimen: Blood Updated: 03/01/24 1115     HS Troponin T 8 ng/L     Narrative:      High Sensitive Troponin T Reference Range:  <14.0 ng/L- Negative Female for AMI  <22.0 ng/L- Negative Male for AMI  >=14 - Abnormal Female indicating possible myocardial injury.  >=22 - Abnormal Male indicating possible myocardial injury.   Clinicians would have to utilize clinical acumen, EKG, Troponin, and serial changes to determine if it is an Acute Myocardial Infarction or myocardial injury due to an underlying chronic condition.         Magnesium [[id number]]  (Normal) Collected: 03/01/24 1041    Specimen: Blood Updated: 03/01/24 1113     Magnesium 1.9 mg/dL     CBC Auto Differential [[id number]]  (Normal) Collected: 03/01/24 1041    Specimen: Blood Updated: 03/01/24 1051     WBC 7.55 10*3/mm3      RBC 4.77 10*6/mm3      Hemoglobin 13.9 g/dL      Hematocrit 41.3 %      MCV 86.6 fL      MCH 29.1 pg      MCHC 33.7 g/dL      RDW 14.3 %      RDW-SD 45.6 fl      MPV 9.5 fL      Platelets 279 10*3/mm3      Neutrophil % 62.2 %      Lymphocyte % 26.6 %      Monocyte % 6.9 %       Eosinophil % 3.2 %      Basophil % 0.7 %      Immature Grans % 0.4 %      Neutrophils, Absolute 4.70 10*3/mm3      Lymphocytes, Absolute 2.01 10*3/mm3      Monocytes, Absolute 0.52 10*3/mm3      Eosinophils, Absolute 0.24 10*3/mm3      Basophils, Absolute 0.05 10*3/mm3      Immature Grans, Absolute 0.03 10*3/mm3      nRBC 0.0 /100 WBC     Acetaminophen Level [[id number]]  (Normal) Collected: 03/01/24 1041    Specimen: Blood Updated: 03/01/24 1113     Acetaminophen <5.0 mcg/mL     Narrative:      Toxic = Greater than 150 mcg/mL    Ethanol [[id number]] Collected: 03/01/24 1041    Specimen: Blood Updated: 03/01/24 1113     Ethanol <10 mg/dL      Ethanol % <0.010 %     Narrative:      This result is for medical use only and should not be used for forensic purposes.    Salicylate Level [[id number]]  (Normal) Collected: 03/01/24 1041    Specimen: Blood Updated: 03/01/24 1113     Salicylate <0.3 mg/dL     Valproic Acid Level, Total [[id number]]  (Normal) Collected: 03/01/24 1041    Specimen: Blood Updated: 03/01/24 1113     Valproic Acid 68.5 mcg/mL     Narrative:      Therapeutic Ranges for Valproic Acid    Epilepsy:       mcg/ml  Bipolar/Francy  up to 125 mcg/ml      Urinalysis With Microscopic If Indicated (No Culture) - Urine, Clean Catch [[id number]]  (Normal) Collected: 03/01/24 1106    Specimen: Urine, Clean Catch Updated: 03/01/24 1114     Color, UA Yellow     Appearance, UA Clear     pH, UA 7.0     Specific Gravity, UA <=1.005     Glucose, UA Negative     Ketones, UA Negative     Bilirubin, UA Negative     Blood, UA Negative     Protein, UA Negative     Leuk Esterase, UA Negative     Nitrite, UA Negative     Urobilinogen, UA 0.2 E.U./dL    Narrative:      Urine microscopic not indicated.    Urine Drug Screen - Urine, Clean Catch [[id number]]  (Abnormal) Collected: 03/01/24 1106    Specimen: Urine, Clean Catch Updated: 03/01/24 1122     THC, Screen, Urine Positive     Phencyclidine (PCP), Urine Negative     Cocaine  Screen, Urine Negative     Methamphetamine, Ur Negative     Opiate Screen Negative     Amphetamine Screen, Urine Negative     Benzodiazepine Screen, Urine Positive     Tricyclic Antidepressants Screen Negative     Methadone Screen, Urine Negative     Barbiturates Screen, Urine Negative     Oxycodone Screen, Urine Negative     Buprenorphine, Screen, Urine Negative    Narrative:      Cutoff For Drugs Screened:    Amphetamines               500 ng/ml  Barbiturates               200 ng/ml  Benzodiazepines            150 ng/ml  Cocaine                    150 ng/ml  Methadone                  200 ng/ml  Opiates                    100 ng/ml  Phencyclidine               25 ng/ml  THC                         50 ng/ml  Methamphetamine            500 ng/ml  Tricyclic Antidepressants  300 ng/ml  Oxycodone                  100 ng/ml  Buprenorphine               10 ng/ml    The normal value for all drugs tested is negative. This report includes unconfirmed screening results, with the cutoff values listed, to be used for medical treatment purposes only.  Unconfirmed results must not be used for non-medical purposes such as employment or legal testing.  Clinical consideration should be applied to any drug of abuse test, particularly when unconfirmed results are used.      Fentanyl, Urine - Urine, Clean Catch [[id number]]  (Normal) Collected: 03/01/24 1106    Specimen: Urine, Clean Catch Updated: 03/01/24 1132     Fentanyl, Urine Negative    Narrative:      Negative Threshold:      Fentanyl 5 ng/mL     The normal value for the drug tested is negative. This report includes final unconfirmed screening results to be used for medical treatment purposes only. Unconfirmed results must not be used for non-medical purposes such as employment or legal testing. Clinical consideration should be applied to any drug of abuse test, particularly when unconfirmed results are used.                    XR Chest 1 View    Result Date:  3/1/2024  PROCEDURE: XR CHEST 1 VW-  HISTORY: Weak/Dizzy/[facility] triage protocol  COMPARISON: 6/30/2022.  FINDINGS: The heart is normal in size. The mediastinum is unremarkable. The lungs are clear. There is no pneumothorax.  There are no acute osseous abnormalities.      Impression: No acute cardiopulmonary process.     Images were reviewed, interpreted, and dictated by Dr. Jovana Eugene MD Transcribed by Vivek Lancaster PA-C.          MDM  Patient was evaluated in the ER for anxiety/confusion.  He is alert and oriented x 3.  Patient has history of bipolar and schizophrenia disorder.  Recently saw psychiatrist and was started on Invega.  Differential diagnosis includes was not limited to psychosis, medication side effect, catatonia, benzo withdrawal, among others.  Patient denies any missed doses of Ativan recently today.  Denies auditory and visual hallucinations and SI and HI.  Initial plan includes medical clearance workup and behavioral health evaluation.        Final diagnoses:   None

## 2024-03-01 NOTE — PLAN OF CARE
Goal Outcome Evaluation:  Plan of Care Reviewed With: patient  Patient Agreement with Plan of Care: agrees     Progress: no change  Outcome Evaluation: Pt new admit to unit.  Pt calm and cooperative.

## 2024-03-01 NOTE — SIGNIFICANT NOTE
03/01/24 1456   Group Therapy Session   Group Attendance attended group session   Time Session Began 1400   Time Session Ended 1450   Total Time (minutes) 50   Group Type psychoeducation;psychotherapeutic   Group Topic Covered coping skills/lifestyle management   Group Session Detail Patients participated in utilizing critical thinking to brainstorm different coping strategies based on the letter in the alphabet. Patients participated in discussing healthy vs. unhealthy coping strategies.   Patient Participation/Contribution did not discuss personal experience;did not share thoughts verbally

## 2024-03-02 PROBLEM — F32.A DEPRESSION: Status: RESOLVED | Noted: 2024-03-01 | Resolved: 2024-03-02

## 2024-03-02 PROBLEM — F17.210 CIGARETTE NICOTINE DEPENDENCE WITHOUT COMPLICATION: Status: ACTIVE | Noted: 2024-03-02

## 2024-03-02 PROBLEM — F06.1: Status: ACTIVE | Noted: 2024-03-02

## 2024-03-02 PROBLEM — F31.30: Status: ACTIVE | Noted: 2024-03-02

## 2024-03-02 PROBLEM — J06.9 URI (UPPER RESPIRATORY INFECTION): Status: ACTIVE | Noted: 2024-03-02

## 2024-03-02 PROBLEM — F12.20 CANNABIS DEPENDENCE: Status: ACTIVE | Noted: 2024-03-02

## 2024-03-02 LAB — VALPROATE SERPL-MCNC: 76.9 MCG/ML (ref 50–125)

## 2024-03-02 PROCEDURE — 80164 ASSAY DIPROPYLACETIC ACD TOT: CPT | Performed by: STUDENT IN AN ORGANIZED HEALTH CARE EDUCATION/TRAINING PROGRAM

## 2024-03-02 PROCEDURE — 99222 1ST HOSP IP/OBS MODERATE 55: CPT | Performed by: STUDENT IN AN ORGANIZED HEALTH CARE EDUCATION/TRAINING PROGRAM

## 2024-03-02 RX ORDER — TRAZODONE HYDROCHLORIDE 50 MG/1
50 TABLET ORAL NIGHTLY
Status: DISCONTINUED | OUTPATIENT
Start: 2024-03-02 | End: 2024-03-04 | Stop reason: HOSPADM

## 2024-03-02 RX ORDER — NICOTINE 21 MG/24HR
1 PATCH, TRANSDERMAL 24 HOURS TRANSDERMAL EVERY 24 HOURS
Status: DISCONTINUED | OUTPATIENT
Start: 2024-03-02 | End: 2024-03-04 | Stop reason: HOSPADM

## 2024-03-02 RX ADMIN — LORAZEPAM 2 MG: 2 TABLET ORAL at 08:21

## 2024-03-02 RX ADMIN — TRAZODONE HYDROCHLORIDE 50 MG: 50 TABLET ORAL at 20:18

## 2024-03-02 RX ADMIN — GUAIFENESIN 200 MG: 100 SOLUTION ORAL at 22:52

## 2024-03-02 RX ADMIN — Medication 1 PATCH: at 14:42

## 2024-03-02 RX ADMIN — GUAIFENESIN 200 MG: 100 SOLUTION ORAL at 13:58

## 2024-03-02 RX ADMIN — LORAZEPAM 2 MG: 2 TABLET ORAL at 14:43

## 2024-03-02 RX ADMIN — DIVALPROEX SODIUM 500 MG: 500 TABLET, EXTENDED RELEASE ORAL at 20:18

## 2024-03-02 RX ADMIN — GUAIFENESIN 200 MG: 100 SOLUTION ORAL at 01:45

## 2024-03-02 RX ADMIN — DIVALPROEX SODIUM 500 MG: 500 TABLET, EXTENDED RELEASE ORAL at 08:21

## 2024-03-02 RX ADMIN — LORAZEPAM 2 MG: 2 TABLET ORAL at 20:18

## 2024-03-02 RX ADMIN — LISINOPRIL 20 MG: 10 TABLET ORAL at 08:21

## 2024-03-02 RX ADMIN — OLANZAPINE 10 MG: 5 TABLET, FILM COATED ORAL at 20:17

## 2024-03-02 RX ADMIN — GUAIFENESIN 200 MG: 100 SOLUTION ORAL at 18:31

## 2024-03-02 NOTE — SIGNIFICANT NOTE
03/02/24 1324   Group Therapy Session   Group Attendance attended group session   Time Session Began 1030   Time Session Ended 1100   Total Time (minutes) 30   Group Type psychotherapeutic   Group Topic Covered cognitive activities   Group Session Detail Patients are asked to list five things that make life enjoyable or worthwhile. Then they are asked to describe their most recent difficult situation, or a recent time something did not go their way. Finally, they reflect on and describe silver linings of the difficult situation.   Patient Participation/Contribution cooperative with task   Patient Participation Detail Literature provided to be completed independently.   Affect During Group affect consistent with mood
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
## 2024-03-02 NOTE — PLAN OF CARE
"  Problem: Adult Behavioral Health Plan of Care  Goal: Plan of Care Review  Outcome: Ongoing, Progressing  Flowsheets  Taken 3/2/2024 1130 by Roberto Bran  Outcome Evaluation: New admit. Completed social history and integrated summary.  Taken 3/2/2024 0800 by Hallie Freire, RN  Plan of Care Reviewed With: patient  Patient Agreement with Plan of Care: agrees  Taken 3/1/2024 1754 by Hallie Freire RN  Progress: no change  Goal: Patient-Specific Goal (Individualization)  Outcome: Ongoing, Progressing  Flowsheets  Taken 3/2/2024 1130 by Roberto Bran  Patient-Specific Goals (Include Timeframe): \"To get better. To get back out in the real world and function as somebody.\" Patient will deny SI/HI prior to discharge. Patient will identify 1 healthy coping skill and consent to appropriate aftercare plan prior to discharge.  Individualized Care Needs: Therapist to offer 1-4 therapy sessions, aftercare, planning, safety planning, family education, group therapy, and brief CBT/MI interventions.  Taken 3/2/2024 1044 by Roberto Bran  Patient Personal Strengths:   positive educational history   resilient   stable living environment   family/social support   expressive of needs  Patient Vulnerabilities: adverse childhood experience(s)  Taken 3/1/2024 2341 by Ambika Barcenas RN  Anxieties, Fears or Concerns: pt feeling depressed  Goal: Optimized Coping Skills in Response to Life Stressors  Outcome: Ongoing, Progressing  Intervention: Promote Effective Coping Strategies  Flowsheets (Taken 3/2/2024 1130)  Supportive Measures:   positive reinforcement provided   active listening utilized   counseling provided   decision-making supported   goal-setting facilitated   verbalization of feelings encouraged   problem-solving facilitated  Goal: Develops/Participates in Therapeutic [place] to Support Successful Transition  Outcome: Ongoing, Progressing  Intervention: Foster Therapeutic [place]  Flowsheets (Taken 3/2/2024 " 1130)  Trust Relationship/Rapport:   care explained   choices provided   thoughts/feelings acknowledged   reassurance provided   emotional support provided   empathic listening provided   questions answered   questions encouraged  Intervention: Mutually Develop Transition Plan  Flowsheets  Taken 3/2/2024 1130 by Roberto Bran  Outpatient/Agency/Support Group Needs:   outpatient medication management   outpatient counseling  Transition Support: follow-up care discussed  Anticipated Discharge Disposition: home with family  Transportation Concerns: none  Current Discharge Risk: psychiatric illness  Concerns to be Addressed: mental health  Readmission Within the Last 30 Days: no previous admission in last 30 days  Patient/Family Anticipated Services at Transition:      mental health services  Taken 3/1/2024 1341 by Terese Rowland, RN  Transportation Anticipated: family or friend will provide  Patient/Family Anticipates Transition to: home with family   Goal Outcome Evaluation:              Outcome Evaluation: New admit. Completed social history and integrated summary.

## 2024-03-02 NOTE — SIGNIFICANT NOTE
Leisure Participation Screen      Pertinent Diagnosis/Presenting Problems    Presenting Problems/Reason for Referral depression   Lifestyle/Recreational History/Interest    Profession/Job unemployed   Current or Previous  Service no   Current Living Situation Living with friends   Transportation Situation I have access to a car   Current Educational Level High school diploma   Support Network Family and friends   Recreational History and Interests    How Important is Recreation to You? very   Satisfied With Leisure Lifestyle? no   Participate Regularly in Leisure Activity? no   Are You a Social Person? sometimes   Do You Prefer To Be Alone? yes   Do You Like New Experiences? yes   Current Hobbies/Interests fishing   Past Hobbies/Interests Fishing, bowling   Barriers To Leisure Activity    Barriers to Leisure Participation income   Coping/Wellbeing    Describe Yourself outgoing   Personal Strengths Hard worker    How Do You [place] With Life Stressors? Workout, watch tv   Recreation Participation    Recreation Inpatient Participation music, television, movies/videos, and listening to music   Objectives of Inpatient Participation Increase motivation and activity level through successful participation  Increase positive attitudes leading to a healthy lesiure lifestyle   Orientation to Unit Programming Patient received explanation of recreation services  Inpatient recreation programming initiated   Was patient able to complete assessment with staff? Yes     Recreation Assessment/Plan    Patient/Family Goal Increase knowledge and utilization of leisure activities as coping skills  Decrease symptom burden through leisure participation  Maintain current cognitive functioning; Increase awareness of local resources  Decrease isolation  Increase self confidence through successful participation in programming  Practice utilizing appropriate social skills  Improve mood and reduce anxiety   Recreation Participation  Goals/Objectives Improve coping skills/strategies   Recreation Plan Structured leisure participation   Referrals to Community Recreation Programs List available on request
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
## 2024-03-02 NOTE — SIGNIFICANT NOTE
03/02/24 1442   Group Therapy Session   Group Attendance excused from group session   Time Session Began 1400   Time Session Ended 1433   Total Time (minutes) 33   Group Type psychoeducation;psychotherapeutic   Group Topic Covered cognitive activities;cognitive therapy techniques   Literature/Videos Given topic handouts;topic videos   Group Session Detail Patient's participated in viewing a video and discussion educating patients on how to recognize cognitive distortions and their impact on mood. By minimizing or avoiding these irrational thought patterns, clients can maintain a more balanced perspective, lower anxiety, and feel better about themselves.   Patient Participation Detail Patient excused from group due to meeting with Psychiatrist

## 2024-03-03 PROCEDURE — 99232 SBSQ HOSP IP/OBS MODERATE 35: CPT | Performed by: STUDENT IN AN ORGANIZED HEALTH CARE EDUCATION/TRAINING PROGRAM

## 2024-03-03 RX ADMIN — LORAZEPAM 2 MG: 2 TABLET ORAL at 08:30

## 2024-03-03 RX ADMIN — DIVALPROEX SODIUM 500 MG: 500 TABLET, EXTENDED RELEASE ORAL at 20:05

## 2024-03-03 RX ADMIN — LISINOPRIL 20 MG: 10 TABLET ORAL at 08:29

## 2024-03-03 RX ADMIN — DIVALPROEX SODIUM 500 MG: 500 TABLET, EXTENDED RELEASE ORAL at 08:29

## 2024-03-03 RX ADMIN — Medication 1 PATCH: at 14:42

## 2024-03-03 RX ADMIN — OLANZAPINE 5 MG: 5 TABLET, FILM COATED ORAL at 20:05

## 2024-03-03 RX ADMIN — LORAZEPAM 2 MG: 2 TABLET ORAL at 14:42

## 2024-03-03 RX ADMIN — GUAIFENESIN 200 MG: 100 SOLUTION ORAL at 03:40

## 2024-03-03 RX ADMIN — TRAZODONE HYDROCHLORIDE 50 MG: 50 TABLET ORAL at 20:05

## 2024-03-03 RX ADMIN — LORAZEPAM 2 MG: 2 TABLET ORAL at 20:05

## 2024-03-03 NOTE — PLAN OF CARE
Goal Outcome Evaluation:  Plan of Care Reviewed With: patient  Patient Agreement with Plan of Care: agrees     Progress: improving  Outcome Evaluation: Pt denies SI/HI/AVH.  Pt reports anxiety/depression 4/10.  No PRN's required this shift.  Pt calm and cooperative throughout shift.

## 2024-03-03 NOTE — SIGNIFICANT NOTE
03/03/24 1602   Group Therapy Session   Group Attendance attended group session   Time Session Began 1430   Time Session Ended 1500   Total Time (minutes) 30   Group Type psychotherapeutic   Group Topic Covered cognitive therapy techniques   Literature/Videos Given Comments Practiced releasing negative feelings by identifying negative beliefs and challeneged if beliefs are based on reality. Explored individual desires and created positive affirmations.   Patient Participation/Contribution cooperative with task

## 2024-03-03 NOTE — PROGRESS NOTES
Inpatient Psych Progress Note     Clinician: Rebecca Swanson MD  Admission Date: 3/1/2024  12:38 EST 03/03/24    Behavioral Health Treatment Plan and Problem List: I have reviewed and approved the Behavioral Health Treatment Plan and Problem list.    Allergies  No Known Allergies    Hospital Day: 2 days      Assessment completed within view of staff    History  CC/clinical focus: F/u catatonia    Interval HPI: Patient seen and evaluated by me.  Chart reviewed. Discussed with treatment team and unit staff. No major issues overnight. Med compliant. Pt has received several doses of Robitussin. No other PRNs required. Pt has been participating in therapeutic activities without issue. Interactions with staff and peers have been appropriate.     On interview today, pt is calm and cooperative. He reports that he is doing okay today. He states that depression and anxiety are both at 2-3/10 today. He feels that thoughts are clear and organized, and does not feel confused at all. He thinks that he might get a little drowsy at times after increasing frequency of Ativan, but he might also just feel tired from poor sleep. Pt says that he did not sleep well last night due to his room being too cold. He only used 1 blanket last night, and pt assured that he could ask staff for more blankets if needed. Appetite has been good. Pt states that even if the increased frequency of Ativan might be causing a little fatigue, he would rather continue on the higher frequency for now as he feels that it has been more effective at keeping his thoughts clear than his BID dose PTA. He denies SI/HI/AVH. He continues to experience a dry cough and mild congestion, but denies SOA, wheezing, sore throat, runny nose, fever/chills. ROS otherwise as below. Pt thinks that hospitalization has been beneficial as he worries that he would have decompensated further if he hadn't been admitted. He believes that he will be ready for discharge tomorrow.  "    Interval Review of Systems:   General ROS: negative for - fever or malaise  Endocrine ROS: negative for - palpitations  Respiratory ROS: + cough, no shortness of breath, or wheezing  Cardiovascular ROS: no chest pain or dyspnea on exertion  Gastrointestinal ROS: no abdominal pain, no black or bloody stools    /86   Pulse 110   Temp 98.2 °F (36.8 °C) (Oral)   Resp 16   Ht 167.6 cm (66\")   Wt 91.8 kg (202 lb 6.4 oz)   SpO2 96%   BMI 32.67 kg/m²     Mental Status Exam  Behavior: Cooperative, fair eye contact  Psychomotor activity: Calm  Orientation: x4  Mood: \"okay\"  Affect: mood congruent, constricted but euthymic  Thought Process: linear, organized  Thought Content: No delusions voiced  Hallucinations: Denies AVH, no RIS observed  Concentration: Fair  Suicidal Ideation: Denies  Homicidal Ideation: Denies  Hopelessness: Denies  Insight: Fair  Judgement: Fair      Medical Decision Making:   Labs:     Lab Results (last 24 hours)       Procedure Component Value Units Date/Time    Valproic Acid Level, Total [[id number]]  (Normal) Collected: 03/02/24 1955    Specimen: Blood Updated: 03/02/24 2022     Valproic Acid 76.9 mcg/mL     Narrative:      Therapeutic Ranges for Valproic Acid    Epilepsy:       mcg/ml  Bipolar/Francy  up to 125 mcg/ml                Radiology:     Imaging Results (Last 24 Hours)       ** No results found for the last 24 hours. **              EKG:     ECG/EMG Results (most recent)       None             Medications:  divalproex, 500 mg, Oral, BID  lisinopril, 20 mg, Oral, Daily  LORazepam, 2 mg, Oral, TID  nicotine, 1 patch, Transdermal, Q24H  OLANZapine, 5 mg, Oral, Nightly  traZODone, 50 mg, Oral, Nightly           All medications reviewed.    Assessment and Plan:      Bipolar disorder, most recent episode depression with catatonic features  - Continue hospitalization on the [facility] for safety and stabilization.  - Appropriate precautions ordered; SP3  - Encourage " engagement in individual, group, and family therapies as appropriate.  - Collateral as needed  - Continue Ativan 2 mg TID for catatonia. Pt tolerating well thus far with significant improvement in symptoms compared to PTA. Does feel a little drowsy at times after increasing frequency, but pt believes that it is more effective at managing his catatonic symptoms as BID. Will continue on TID for now with plan to taper to BID once he follows up with outpatient provider.   - Continue home Depakote  mg BID. VPA level 3/2/24 therapeutic at 76.9.   - Decrease Zyprexa to 5 mg QHS tonight, then discontinue in 1-2 days if pt continues doing well with taper.   - Pt to continue Invega Sustenna. Due for booster dose on 3/7 - pt reports that he already has appt for this.   - Continue Trazodone 50 mg QHS for sleep, with additional 50 mg available as needed for sleep interruptions. Pt reported that he still did not sleep well last night, but reports that it was due to excessive cold. Pt assured that additional blankets can be provided upon request.      HTN  - Continue home lisinopril 20 mg QD     URI  - Positive for rhino/enterovirus on admission resp panel  - Pt currently only c/o cough, congestion. Will provide PRNs for symptomatic relief.      Seizure disorder  - Continue home Depakote  mg BID  - VPA level drawn on admission was not trough. Will attempt to draw trough tonight prior to evening administration and consider titration if needed.      Nicotine dependence  - Nicotine patch provided  - Pt currently open to cutting down on cigarette use but is unsure about quitting.     Cannabis dependence  - Pt currently smoking 3-4 joints every other day  - Pt cautioned about possible adverse effects of cannabis use in individuals with mental health issues  - Pt believes that cannabis helps to calm him, and is not interested in quitting at this time.  - Offer CD treatment resources on discharge.      New lab results  reviewed: VPA trough 76.9 on 3/2/24      Continue hospitalization for safety and stabilization.  Continue current level of Special Precautions (q15 minute checks). Anticipate discharge tomorrow if no major issues overnight.      This document has been electronically signed by Rebecca Swanson MD.  March 3, 2024 12:38 EST

## 2024-03-03 NOTE — THERAPY TREATMENT NOTE
"DATA:    Therapist met with the patient individually. Therapist continues reviewing plan of care and aftercare plan.  The patient was agreeable.    ASSESSMENT:    Patient was seen for follow up of bipolar disorder.       Today, patient was seen 1-1 in office. Patient reports mood is \"okay\", affect tearful, thought content normal. Patient reports sleep is Poor \"last night but the night before was great\" and appetite is Good. On scale 1-10, patient rates depression 6 and anxiety 1. Patient denies hallucinations, SI/HI and death wish. Patient states he was admitted for \"anxiety and nervousness.\" Patient first identifies stressor as his daughters 18th birthday on 3/1/24 but later reports symptoms had been worsening before that. Patient expressed concern he wont be able to return to [facility], despite reporting her visit yesterday went well.     CLINICAL MANEUVERING:    Therapist provided safe, secure environment for patient to share.  Provided reflective listening and psychoeducation.  Assisted patient in processing the above session. Assisted patient in identifying any questions or needs to be addressed today. Provided supportive therapy.      Therapist contacted Irene Trejo ([phone]) for family session to explore discharge planning. Irene confirms patient can return to her home at discharge and all medications/sharps/weapons have been secured and locked up.       Plan:     Patient to remain hospitalized this date.      Treatment team will focus efforts on stabilizing patient's acute symptoms while providing education on healthy coping and crisis management to reduce hospitalizations.   Patient requires daily psychiatrist evaluation and 24/7 nursing supervision to promote patient  safety.     Therapist will offer 1-4 individual sessions, family education, and appropriate referral.     Therapist recommends continued assessment. Patient to follow up with [place] ([facility]) at discharge.   "

## 2024-03-03 NOTE — SIGNIFICANT NOTE
03/03/24 1126   Group Therapy Session   Group Attendance attended group session   Time Session Began 1030   Time Session Ended 1100   Total Time (minutes) 30   Group Type psychoeducation;psychotherapeutic   Group Topic Covered other (see comments)  (values)   Group Session Detail provided psychoeducation on values and how our values assist in setting goals, decision making and inspire behavior change. Explored individual values, where these values originated from, and how they can change over time.   Patient Participation/Contribution cooperative with task;discussed personal experience with topic;listened actively;organized   Affect During Group affect consistent with mood;appropriate to situation

## 2024-03-04 VITALS
DIASTOLIC BLOOD PRESSURE: 82 MMHG | SYSTOLIC BLOOD PRESSURE: 132 MMHG | WEIGHT: 202.4 LBS | HEIGHT: 66 IN | TEMPERATURE: 97.6 F | RESPIRATION RATE: 18 BRPM | OXYGEN SATURATION: 97 % | BODY MASS INDEX: 32.53 KG/M2 | HEART RATE: 94 BPM

## 2024-03-04 PROCEDURE — 99239 HOSP IP/OBS DSCHRG MGMT >30: CPT | Performed by: STUDENT IN AN ORGANIZED HEALTH CARE EDUCATION/TRAINING PROGRAM

## 2024-03-04 RX ORDER — LORAZEPAM 2 MG/1
2 TABLET ORAL 3 TIMES DAILY
Start: 2024-03-04

## 2024-03-04 RX ORDER — DIVALPROEX SODIUM 500 MG/1
500 TABLET, EXTENDED RELEASE ORAL 2 TIMES DAILY
Start: 2024-03-04

## 2024-03-04 RX ORDER — TRAZODONE HYDROCHLORIDE 50 MG/1
50 TABLET ORAL NIGHTLY PRN
Qty: 30 TABLET | Refills: 0 | Status: SHIPPED | OUTPATIENT
Start: 2024-03-04

## 2024-03-04 RX ADMIN — LORAZEPAM 2 MG: 2 TABLET ORAL at 08:54

## 2024-03-04 RX ADMIN — LISINOPRIL 20 MG: 10 TABLET ORAL at 08:54

## 2024-03-04 RX ADMIN — DIVALPROEX SODIUM 500 MG: 500 TABLET, EXTENDED RELEASE ORAL at 08:54

## 2024-03-04 NOTE — DISCHARGE SUMMARY
Inpatient Psychiatry Discharge Summary    Date of Admission:  3/1/2024  1:13 PM   Date of Discharge:  3/4/2024    Discharge Diagnosis:Principal Problem:    Bipolar I disorder, most recent episode depressed with catatonia  Active Problems:    URI (upper respiratory infection)    Cigarette nicotine dependence without complication    Cannabis dependence      History of Presenting Illness (per H&P 3/2/24):  Sekou Hackett is a 43 y.o. male admitted to the [facility] on 3/1/2024. Pt has a previous history of bipolar 1 disorder with catatonic features and a recent suicide attempt. Pt was evaluated by me on 03/02/24. Pt presents with concern for returning catatonia. Per CDU documentation, pt's sister Irene reported that pt had been experiencing confusion, especially at night. He stands and stares at her when she asks him questions, seems to have forgotten where his bed and his clothes are, and has been isolating more. Pt exhibited similar behaviors prior to attempt suicide last month and being admitted to [facility] for suicide attempt and management of catatonia, and family was very concerned that this may happen again if he does not receive adequate treatment.  Patient was admitted to the [facility] for safety and stabilization, and was initiated on Ativan 2 mg 3 times daily for management of catatonia upon admission.     On initial interview on the unit, patient is calm, cooperative, and pleasant.  He answers all questions appropriately.  Patient states that he has been doing fairly well following his discharge from [facility] until the last few days.  At that time, he ran out of his prescribed Ativan and quickly started feeling confused.  He reports that he was lucid on and off since then, but seems to have returned to his baseline since arrival to the unit.  Patient says that after running out of Ativan, he started to have more issues with sleep, and has had poor sleep over the past 3 days now.  Appetite also  "decreased a little bit.  Per Denys, patient received a 30-day supply of Ativan 2 mg #60 doses on 2/2/2024, and should have had enough doses to last him until today.  Patient is unable to explain why he might have run out of early.  He states that his sister administers his medications to him, and he has no suspicion that his sister may be misusing his medications.  Patient denies overtaking any of his medications.  Patient followed up with Amanda Freire at [facility] a couple of days ago and was restarted on his previous dose of Ativan, and he believes that it has been helpful.  Patient also received his first dose of Invega Sustenna 2 days ago, and reports that he is due for a booster dose in approximately 1 week.  Patient believes that he has been doing very well on his home regimen and was not feeling very depressed, so he is unsure why he required admission for stabilization.  However, he states that now that he is here, he thinks that it would be a good idea to make sure that he is stable on his Ativan before discharge.  Patient is reassured that this current hospital stay is likely to be brief.  When asked about his history of bipolar disorder, patient states that he was diagnosed with bipolar disorder \"a long time ago\", and typically experiences manic symptoms about once every 2 months.  These episodes typically last 2 to 3 days.  During these times, patient is much more irritable and agitated than normal, does not need to sleep, has excessive energy, and sometimes gets aggressive.  He has previously required police involvement due to his aggressive behaviors while manic.  Patient says that he has also struggled with depressive episodes on and off for \"a long time\", worsening after he  from his wife in 2009.  Patient currently rates depression at 5-6/10 and he rates anxiety at 4-5/10.  Patient currently denies SI/HI/AVH.  He states that he would like to work on improving his sleep while here, and notes " that he has previously taken trazodone with good effect.    Hospital Course:  Patient was admitted for safety and stabilization.  Patient was assigned a master's level therapist and provided with an opportunity to participate in group and individual therapy and counseling on the unit. Due to concern for returning catatonic symptoms, Ativan was increased from 2 mg BID to TID upon admission, which resulted in rapid and significant improvement in catatonia. As pt had just initiated Invega Sustenna and had no clear indication for treatment with multiple antipsychotics, Zyprexa was gradually tapered and then discontinued upon discharge. Pt was maintained on home dose Depakote  mg BID for mood stabilization and seizure prophylaxis. VPA level on 3/2/24 was therapeutic at 76.9. Pt was also started on Trazodone 50 mg QHS for sleep. Pt tolerated these adjustments without major issues. He did note possible mild drowsiness after increasing Ativan frequency, but pt felt that the improvement in his catatonia made this possible side effect worth it. Pt later reported improvement in drowsiness, and suspected that poor sleep may have been contributing rather than medication side effect. Pt engaged well in therapeutic groups. Following Ativan increase, mood and catatonic symptoms remained stable throughout his hospitalization. On day of discharge, pt denied SI/HI/AVH. It was felt that pt had met maximum benefit of hospitalization, and was determined to be safe for discharge from the unit. Safety planning was completed and reviewed with therapist. Outpatient mental health follow-up was ascertained prior to discharge.    Medically, pt was continued on home lisinopril for HTN. Pt was found to be positive for rhinovirus on admission, was encouraged to wear a mask while in public areas of the unit, and was provided with PRNs for symptomatic relief. Pt was maintained on home Depakote dose for seizure prophylaxis. Routine labs were  checked upon admission and were unremarkable except for: UDS + THC, benzos (prescribed). VPA on admission 68.5 (not trough). VPA trough 76.9 on 3/2/24. Resp panel + rhino/enterovirus. EKG on admission showed: Sinus tach, , Qtc 381 ms. CXR on admission was unremarkable.    Consults:   Consults       No orders found from 2/1/2024 to 3/2/2024.            Labs:  Lab Results (all)       Procedure Component Value Units Date/Time    Valproic Acid Level, Total [[id number]]  (Normal) Collected: 03/02/24 1955    Specimen: Blood Updated: 03/02/24 2022     Valproic Acid 76.9 mcg/mL     Narrative:      Therapeutic Ranges for Valproic Acid    Epilepsy:       mcg/ml  Bipolar/Francy  up to 125 mcg/ml      Respiratory Panel PCR w/COVID-19(SARS-CoV-2) TOÑO/DEBRA/WAQAS/PAD/COR/ANGELA In-House, NP Swab in UTM/VTM, 2 HR TAT - Swab, Nasopharynx [[id number]]  (Abnormal) Collected: 03/01/24 2022    Specimen: Swab from Nasopharynx Updated: 03/01/24 2140     ADENOVIRUS, PCR Not Detected     Coronavirus 229E Not Detected     Coronavirus HKU1 Not Detected     Coronavirus NL63 Not Detected     Coronavirus OC43 Not Detected     COVID19 Not Detected     Human Metapneumovirus Not Detected     Human Rhinovirus/Enterovirus Detected     Influenza A PCR Not Detected     Influenza B PCR Not Detected     Parainfluenza Virus 1 Not Detected     Parainfluenza Virus 2 Not Detected     Parainfluenza Virus 3 Not Detected     Parainfluenza Virus 4 Not Detected     RSV, PCR Not Detected     Bordetella pertussis pcr Not Detected     Bordetella parapertussis PCR Not Detected     Chlamydophila pneumoniae PCR Not Detected     Mycoplasma pneumo by PCR Not Detected    Narrative:      In the setting of a positive respiratory panel with a viral infection PLUS a negative procalcitonin without other underlying concern for bacterial infection, consider observing off antibiotics or discontinuation of antibiotics and continue supportive care. If the respiratory panel is  "positive for atypical bacterial infection (Bordetella pertussis, Chlamydophila pneumoniae, or Mycoplasma pneumoniae), consider antibiotic de-escalation to target atypical bacterial infection.            Imaging:  Imaging Results (All)       None            Mental Status Exam on Day of Discharge  Behavior: Cooperative, fair eye contact, lying in bed  Psychomotor activity: Calm  Orientation: x4  Mood: \"pretty good\"  Affect: constricted but euthymic  Thought Process: linear, organized  Thought Content: No delusions voiced  Hallucinations: Denies AVH, no RIS observed  Concentration: Fair  Suicidal Ideation: Denies  Homicidal Ideation: Denies  Hopelessness: Denies  Insight: Fair  Judgement: Fair     Condition on Discharge:  Stable    Vital Signs  Temp:  [97.6 °F (36.4 °C)-97.8 °F (36.6 °C)] 97.6 °F (36.4 °C)  Heart Rate:  [] 94  Resp:  [16-18] 18  BP: (106-132)/(76-82) 132/82    Discharge Disposition  Home or Self Care    Discharge Medications     Discharge Medications        New Medications        Instructions Start Date   traZODone 50 MG tablet  Commonly known as: DESYREL   50 mg, Oral, Nightly PRN             Changes to Medications        Instructions Start Date   divalproex 500 MG 24 hr tablet  Commonly known as: DEPAKOTE ER  What changed:   when to take this  Another medication with the same name was removed. Continue taking this medication, and follow the directions you see here.   500 mg, Oral, 2 Times Daily      LORazepam 2 MG tablet  Commonly known as: ATIVAN  What changed:   when to take this  reasons to take this   2 mg, Oral, 3 times daily             Continue These Medications        Instructions Start Date   aspirin 81 MG EC tablet   81 mg, Oral, Daily      hydrOXYzine pamoate 50 MG capsule  Commonly known as: VISTARIL   50 mg, Oral, 3 Times Daily PRN      lisinopril 20 MG tablet  Commonly known as: PRINIVIL,ZESTRIL   20 mg, Oral, Daily             Stop These Medications      NYQUIL PO     ondansetron " "ODT 8 MG disintegrating tablet  Commonly known as: Zofran ODT     paliperidone 3 MG 24 hr tablet  Commonly known as: INVEGA     risperiDONE 0.5 MG tablet  Commonly known as: risperDAL              Discharge Diet: Regular    Activity at Discharge: As tolerated    Follow-up Appointments:      Follow up with [facility] [place] [id number]  [phone]    MAR    [facility] [employer] [place] Video Visit with Rebecca Swanson  Tuesday Mar 12, 2024 8:00 AM  Please review the Appointment Instructions website the day before your appointment.  Ensure in [employer] that you click Appointments and not Urgent Care Video Visits to access your scheduled video visit.  Please sign in to [employer] 15 minutes early for your appointment to complete the eCheck in process prior to your appointment.  The video portion of your visit will take place within the [employer] platform.  You will not be required to download an application for this visit as [employer] will automatically launch within your web browser    To test your hardware prior to the visit starting after clicking \"Join video visit\" above.    Make sure that, during your video visit, you are in the state you verified when you scheduled your video appointment.           Additional Information  [address] [place] [address] View Map  Phone: [phone]  Fax: [phone]     Mon/Wed/Thu 8:00 a.m.-7:00 p.m., Tue/Fri 8:00 a.m.-5:00 p.m.  Evening and weekend hours available upon request  Call the 24-Hour Helpline to make an appointment [phone]     You have an appointment on:   March 19, 2024 at 8:00 am for therapy  April 4, 2024 at 8:30 Med Management with Celestina Freire       Time: I spent 33 minutes on this discharge activity which included: face-to-face encounter with the patient, reviewing the data in the system, coordination of the care with the nursing staff as well as consultants, documentation, and entering orders.          This document has been electronically signed by Rebecca" MD Sky.  March 4, 2024 11:51 EST

## 2024-03-04 NOTE — DISCHARGE INSTR - APPOINTMENTS
[address] [place] [address] View Map  Phone: [phone]  Fax: [phone]    Mon/Wed/Thu 8:00 a.m.-7:00 p.m., Tue/Fri 8:00 a.m.-5:00 p.m.  Evening and weekend hours available upon request  Call the 24-Hour Helpline to make an appointment [phone]    You have an appointment on:   March 19, 2024 at 8:00 am for therapy  April 4, 2024 at 8:30 Med Management with Celestina Freire

## 2024-03-04 NOTE — SIGNIFICANT NOTE
03/04/24 1313   Group Therapy Session   Group Attendance attended group session   Time Session Began 1040   Time Session Ended 1110   Total Time (minutes) 30   Group Type psychotherapeutic   Group Topic Covered coping skills/lifestyle management;self care activities   Group Session Detail Patients participated in a worksheet to help patient reflect on their current self-care practices, recognize areas where they could improve, and generate ideas for new self-care activities they would enjoy. Patients participated in group discussion about the importance of self-care.   Patient Participation/Contribution cooperative with task   Affect During Group affect consistent with mood   Degree of Insight moderate

## 2024-03-04 NOTE — THERAPY DISCHARGE NOTE
Therapist met 1-1 in the office. Patient calm/cooperative, oriented x 4.  Patient noted to have appropriate affect, congruent mood, normal thought content. Patient denies SI/HI/AVH and acute symptoms.   Safety planning conducted; patient/family denies access to firearms, weapons, medications. Medications were recommended to be safe guarded and for family to administer with patient.     Assisted patient in identifying risk factors which would indicate the need for higher level of care including thoughts to harm self or others and/or self-harming behavior and encouraged patient to call [phone], call 911, or present to the nearest emergency room should any of these events occur. Discussed crisis intervention services and means to access.  Patient adamantly and convincingly denies current suicidal or homicidal ideation or perceptual disturbance.    Therapist completed the following safety plan with the patient       SAFETY/MENTAL HEALTH PLAN    1. Recognizing warning signs: Warning signs that a crisis may be developing such as thoughts, images, mood, situation, behaviors:  Real disoriented, real shaky, anxious.       2. Internal coping strategies: Things that the patient can do to take their mind off problems without contacting another person such as relaxation techniques, physical activity, etc:  Watching movies, fishing       3. Socialization strategies for distraction and support: People and social settings that provide distraction or support - names or places, telephone:   Aliya Hackett (Mother), Irene Trejo (Sister)       4. Social contact for assistance in resolving crisis: People the patient can ask for help - names and telephone:  Aliya Hackett (Mother), Irenete Trejo (Sister)       5. Professionals or agencies contacts to help resolve crisis: Professionals or agencies the patient can contact during a crisis - clinician name/location/phone/emergency contact number, local urgent care services with address/phone,  National Suicide Prevention Lifeline (235), Emergency contact 911:    [facility]  [phone], [phone]       6. Means restriction: Secure sharps, medications, safeguard weapons, identified crisis plan, made multiple outpatient provider appointments for follow up care.

## 2024-03-04 NOTE — SIGNIFICANT NOTE
03/04/24 0931   Living Environment   People in Home sibling(s)   Name(s) of People in Home Irene Trejo   Current Living Arrangements home   Potentially Unsafe Housing Conditions none   In the past 12 months has the electric, gas, oil, or water company threatened to shut off services in your home? No   Primary Care Provided by self   Provides Primary Care For no one   Family Caregiver if Needed sibling(s)   Quality of Family Relationships involved;supportive   Able to Return to Prior Arrangements yes   Transition Planning   Patient/Family Anticipates Transition to home with family   Patient/Family Anticipated Services at Transition mental health services   Transportation Anticipated family or friend will provide   Discharge Needs Assessment,    Readmission Within the Last 30 Days no previous admission in last 30 days   Concerns to be Addressed coping/stress;mental health   Outpatient/Agency/Support Group Needs outpatient counseling;outpatient medication management   Patient's Choice of Community Agency(s) [facility]   Anticipated Discharge Disposition home with family

## 2024-03-04 NOTE — PLAN OF CARE
Goal Outcome Evaluation:  Plan of Care Reviewed With: patient  Patient Agreement with Plan of Care: agrees     Progress: improving  Outcome Evaluation: no acute events during shift at this time. VSS. pt denies SI/HI/hallucinations. pt reports anxiety 3/10 & depression 2/10. pt slept through night. no other changes in pt condition at this time. continue plan of care.

## 2024-03-04 NOTE — PLAN OF CARE
Goal Outcome Evaluation:  Plan of Care Reviewed With: patient  Patient Agreement with Plan of Care: agrees     Progress: improving  Outcome Evaluation: Pt being discharged to home.  Pt given discharge instructions including follow up and medication instructions, verbalized understanding.

## 2024-03-04 NOTE — SIGNIFICANT NOTE
03/04/24 1200   Group Therapy Session   Group Attendance excused from group session   Time Session Began 1130   Time Session Ended 1200   Total Time (minutes) 30   Group Type recreation   Group Topic Covered other (see comments)  (Personal values)   Group Session Detail Patient explored personal values and how they related to selection of recreation activites as well as leisure time.